# Patient Record
Sex: FEMALE | Race: WHITE | NOT HISPANIC OR LATINO | Employment: PART TIME | URBAN - NONMETROPOLITAN AREA
[De-identification: names, ages, dates, MRNs, and addresses within clinical notes are randomized per-mention and may not be internally consistent; named-entity substitution may affect disease eponyms.]

---

## 2017-10-11 DIAGNOSIS — M25.561 RIGHT KNEE PAIN, UNSPECIFIED CHRONICITY: Primary | ICD-10-CM

## 2017-10-12 ENCOUNTER — OFFICE VISIT (OUTPATIENT)
Dept: ORTHOPEDIC SURGERY | Facility: CLINIC | Age: 27
End: 2017-10-12

## 2017-10-12 VITALS — BODY MASS INDEX: 44.41 KG/M2 | WEIGHT: 293 LBS | HEIGHT: 68 IN | RESPIRATION RATE: 16 BRPM

## 2017-10-12 DIAGNOSIS — M25.561 RIGHT KNEE PAIN, UNSPECIFIED CHRONICITY: Primary | ICD-10-CM

## 2017-10-12 PROCEDURE — 99203 OFFICE O/P NEW LOW 30 MIN: CPT | Performed by: ORTHOPAEDIC SURGERY

## 2017-10-12 RX ORDER — DICLOFENAC SODIUM 75 MG/1
TABLET, DELAYED RELEASE ORAL
COMMUNITY
Start: 2017-09-21

## 2017-10-12 RX ORDER — METHYLPREDNISOLONE 4 MG/1
TABLET ORAL
COMMUNITY
Start: 2017-09-20

## 2017-10-12 NOTE — PROGRESS NOTES
Subjective   Patient ID: Henna Camupzano is a 26 y.o. female  Pain of the Right Knee (Patient denies any injury. Patient has rheumatoid arthritis. 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  Most the right knee pain is medial near the insertion of the pes anserine tendon, denies giving way locking or buckling denies lateral or posterior knee pain occasionally has swelling in the calf and ankle and pain with movement in the ankle at times.  Complains of bilateral hand and foot pain consistent with rheumatoid arthritis.    Review of Systems   Constitutional: Negative for diaphoresis, fever and unexpected weight change.   HENT: Negative for dental problem and sore throat.    Eyes: Negative for visual disturbance.   Respiratory: Negative for shortness of breath.    Cardiovascular: Negative for chest pain.   Gastrointestinal: Negative for abdominal pain, constipation, diarrhea, nausea and vomiting.   Genitourinary: Negative for difficulty urinating and frequency.   Musculoskeletal: Positive for arthralgias.   Neurological: Negative for headaches.   Hematological: Does not bruise/bleed easily.   All other systems reviewed and are negative.      Past Medical History:   Diagnosis Date   • Rheumatoid arthritis         Past Surgical History:   Procedure Laterality Date   • DILATATION AND CURETTAGE     • MOUTH SURGERY     • NOSE SURGERY     • TONSILLECTOMY AND ADENOIDECTOMY         Family History   Problem  "Relation Age of Onset   • Stroke Other    • Heart attack Other    • Diabetes Other    • Liver disease Other        Social History     Social History   • Marital status: Single     Spouse name: N/A   • Number of children: N/A   • Years of education: N/A     Occupational History   • Not on file.     Social History Main Topics   • Smoking status: Current Every Day Smoker     Types: Cigarettes   • Smokeless tobacco: Never Used   • Alcohol use No   • Drug use: No   • Sexual activity: Defer     Other Topics Concern   • Not on file     Social History Narrative   • No narrative on file       All the above social hx, family hx, surgical history,medications, allergies, ros & HPI reviewed.    No Known Allergies    Objective   Resp 16  Ht 68\" (172.7 cm)  Wt 296 lb 12.8 oz (135 kg)  BMI 45.13 kg/m2   Physical Exam  Constitutional: He is oriented to person, place, and time. He appears well-developed and well-nourished.   HENT:   Head: Normocephalic and atraumatic.   Eyes: EOM are normal. Pupils are equal, round, and reactive to light.   Neck: Normal range of motion. Neck supple.   Cardiovascular: Normal rate.    Pulmonary/Chest: Effort normal and breath sounds normal.   Abdominal: Soft.   Neurological: He is alert and oriented to person, place, and time.   Skin: Skin is warm and dry.   Psychiatric: He has a normal mood and affect.   Nursing note and vitals reviewed.       Ortho Exam     Right knee with tenderness near the pes anserine insertional area slight swelling or erythema full active extension Ruma sign negative for medial joint line pain negative for lateral joint line pain ligament exam unremarkable sensor mechanism intact minimal patellofemoral crepitus calf supple no edema in the ankle no cutaneous lesions around the knee or ankle  Assessment/Plan   Review of Radiographic Studies:    Radiographic images today of affected area I personally viewed and showed no sign of acute fracture or " dislocation.      Procedures     Henna was seen today for pain.    Diagnoses and all orders for this visit:    Right knee pain, unspecified chronicity  -     MRI Knee Right Without Contrast; Future     MRI right knee      Recommendations/Plan:   Work/Activity Status: May perform usual activities as tolerated    Patient agreeable to call or return sooner for any concerns.         Patient has failed treatment in the past with oral steroids physical therapy and pain is been present for more than 4-6 months.    Impression:  Pes anserine tendinitis with swelling and tenderness medial tibial metaphysis right knee, rule out stress fracture, history of recently diagnosed rheumatoid arthritis, morbid obesity  Plan:  MRI right knee to rule out stress fracture, consider pes anserine tendon steroid injection next visit and referral to therapy after injection

## 2024-07-16 ENCOUNTER — OFFICE VISIT (OUTPATIENT)
Dept: ENDOCRINOLOGY | Facility: CLINIC | Age: 34
End: 2024-07-16
Payer: COMMERCIAL

## 2024-07-16 VITALS
BODY MASS INDEX: 40.92 KG/M2 | HEART RATE: 90 BPM | HEIGHT: 68 IN | OXYGEN SATURATION: 98 % | DIASTOLIC BLOOD PRESSURE: 80 MMHG | WEIGHT: 270 LBS | SYSTOLIC BLOOD PRESSURE: 124 MMHG

## 2024-07-16 DIAGNOSIS — O24.311 PREEXISTING DIABETES COMPLICATING PREGNANCY IN FIRST TRIMESTER, ANTEPARTUM: Primary | ICD-10-CM

## 2024-07-16 LAB
EXPIRATION DATE: ABNORMAL
EXPIRATION DATE: ABNORMAL
GLUCOSE BLDC GLUCOMTR-MCNC: 151 MG/DL (ref 70–130)
HBA1C MFR BLD: 6.1 % (ref 4.5–5.7)
Lab: ABNORMAL
Lab: ABNORMAL

## 2024-07-16 RX ORDER — BLOOD-GLUCOSE METER
EACH MISCELLANEOUS SEE ADMIN INSTRUCTIONS
COMMUNITY
Start: 2024-06-27

## 2024-07-16 RX ORDER — LABETALOL 200 MG/1
TABLET, FILM COATED ORAL
COMMUNITY
Start: 2024-07-01

## 2024-07-16 RX ORDER — BLOOD SUGAR DIAGNOSTIC
STRIP MISCELLANEOUS SEE ADMIN INSTRUCTIONS
COMMUNITY
Start: 2024-06-27

## 2024-07-16 NOTE — PROGRESS NOTES
"Chief Complaint   Patient presents with    Diabetes        Referring Provider  OMAR Mooney    HPI   Henna Campuzano is a 33 y.o. female had concerns including Diabetes.      Referred by Gyn provider for DM in early pregnancy.   She is 8.5 weeks pregnant. . Her son is 16 years old.  Diabetes was diagnosed about 16 years ago, started with GDM and persisted.   A1c has been as high as 12 as of 1-2 years ago. She did a significant diet change to lose weight and control diet. Cut out carbs almost completely.   Complications include neuropathy.  Last ophtho exam was about 3 years ago.   Current medications for diabetes include metformin 500 mg once daily - takes half to a whole tab \"as needed\".  In the past was on mounjaro (for a month) but had GI eval due to side effects and it was stopped. In the past was on ozempic for about a year.   She lost 88 lbs on her own before Ozempic. Total loss was 120 lbs. Has regained some.   Bgs are as low as 60s on occasion, then up to 170s at times.   She checks her blood sugar 4-7 times per day. Bgs ranging 90s often.     Today is 151 1.5-2 hrs after breakfast. Sipping on coke on the way here.     Diet:  Breakfast: today had G.I. Java with sausage and cheese biscuit (half the biscuit), regular coke, doesn't normally eat breakfast but in this pregnancy has been hungry in the mornings. Now has been eating leftovers - cabbage, carrots and broccoli a few days ago. May have protein bar with cranberries, almond.   Lunch:  similar to breakfast, veggies with meat   Dinner: grilled chicken, veggies  Drinks: rare soda, water with sugar free flavoring or coke zero/sprite zero  Snacks: not much, or has almond protein bar      Past Medical History:   Diagnosis Date    Gestational diabetes     Rheumatoid arthritis     Type 2 diabetes mellitus      Past Surgical History:   Procedure Laterality Date    DILATATION AND CURETTAGE      MOUTH SURGERY      NOSE SURGERY      TONSILLECTOMY AND " "ADENOIDECTOMY        Family History   Problem Relation Age of Onset    Stroke Other     Heart attack Other     Diabetes Other     Liver disease Other       Social History     Socioeconomic History    Marital status: Single   Tobacco Use    Smoking status: Every Day     Types: Cigarettes    Smokeless tobacco: Never   Vaping Use    Vaping status: Never Used   Substance and Sexual Activity    Alcohol use: No    Drug use: No    Sexual activity: Defer      No Known Allergies   Current Outpatient Medications on File Prior to Visit   Medication Sig Dispense Refill    Blood Glucose Monitoring Suppl (ONE TOUCH ULTRA 2) w/Device kit See Admin Instructions.      labetalol (NORMODYNE) 200 MG tablet 1 tablet Orally Twice a day 30 days      metFORMIN (GLUCOPHAGE) 500 MG tablet Take 1 tablet by mouth 3 (Three) Times a Day.      OneTouch Verio test strip See Admin Instructions.      [DISCONTINUED] diclofenac (VOLTAREN) 75 MG EC tablet       [DISCONTINUED] MethylPREDNISolone (MEDROL, EZRA,) 4 MG tablet        No current facility-administered medications on file prior to visit.        Review of Systems   Constitutional:  Positive for appetite change and fatigue.   HENT:  Positive for congestion and dental problem.    Eyes: Negative.    Respiratory: Negative.     Cardiovascular:  Positive for leg swelling.   Gastrointestinal: Negative.    Endocrine: Negative.    Genitourinary:  Positive for urgency.   Musculoskeletal:  Positive for arthralgias, back pain and joint swelling.   Skin: Negative.    Allergic/Immunologic: Negative.    Neurological: Negative.    Hematological: Negative.    Psychiatric/Behavioral: Negative.          /80   Pulse 90   Ht 172.7 cm (68\")   Wt 122 kg (270 lb)   SpO2 98%   BMI 41.05 kg/m²      Physical Exam    Constitutional:  well developed; well nourished  no acute distress  obese - Body mass index is 41.05 kg/m².   ENT/Thyroid: no thyromegaly  no palpable nodules   Eyes: EOM intact  Conjunctiva: clear "   Respiratory:  breathing is unlabored  clear to auscultation bilaterally   Cardiovascular:  regular rate and rhythm, S1, S2 normal, no murmur, click, rub or gallop   Chest:  Not performed.   Abdomen: Not performed.   : Not performed.   Musculoskeletal: negative findings:  ROM of all joints is normal, no deformities present   Skin: dry and warm   Neuro: normal without focal findings and mental status, speech normal, alert and oriented x3   Psych: oriented to time, place and person, mood and affect are within normal limits     LABS AND IMAGING    HbA1c:  Lab Results   Component Value Date    HGBA1C 6.1 (A) 07/16/2024     Glucose:  Lab Results   Component Value Date    POCGLU 151 (A) 07/16/2024     7/3/24 TSH 1.68, Free T4 1.1  6/26/24 Cr 0.71, GFR 94  5/22/24 total cholesterol 164, HDL 62, trig 73, LDL 87    Assessment and Plan    Diagnoses and all orders for this visit:    1. Preexisting diabetes complicating pregnancy in first trimester, antepartum (Primary)  DM 2 in pregnancy, 8 weeks and 5 days.  Diagnosed 16 years ago, first with gestational diabetes and then persisted.  A1c has been as high as 12 in the past.  Diabetes complicated by neuropathy.  Today A1c is 6.1.  Mostly diet controlled at this time.  She has been taking metformin only as needed.  Postprandial glucose level is too high today, had fast food with a regular soda.  She rarely has regular soda.  Avoid all regular sweetened beverages.  Metformin can be continued only in first trimester. Discontinue at second trimester.  Would encourage her to take on a scheduled basis as it cannot cause hypoglycemia.  She may have experienced reactive hypoglycemia.  If glucose levels are uncontrolled on metformin, insulin will be needed. As pregnancy and insulin resistance progress, insulin is likely going to be needed.  She is consuming a low-carb diet at this time.  Monitor urine ketones and increase carb intake if moderate or high.  Management of gestational  diabetes was discussed in detail.  Patient was provided with dietary guidelines including carbohydrate targets with meals/snacks as well as BG targets were provided: Fasting less than 95, 1 hour postprandial less than 140, 2 hours postprandial less than 120.    Potential complications related to uncontrolled gestational diabetes were also discussed including, but not limited to, LGA and macrosomia, stillbirth, polyhydramnios,  morbidity including hypoglycemia, increased risk for DM2 beyond pregnancy and risk for GDM with future pregnancies.  Discussed expected changes and potential increases in blood sugar during pregnancy.  I have asked that she monitor her BG's 4+ times per day, fasting, 1 and/or 2 hours postprandial and return in 2-4 weeks for follow-up.  If BG's are not at target despite dietary modifications, insulin may be needed. Pt was advised to contact our office prior to follow-up if BGs are not at target.   Send BG logs weekly.  -     POC Glucose, Blood  -     POC Glycosylated Hemoglobin (Hb A1C)  -     acetone, urine, test strip; Use 1 strip Daily.  Dispense: 50 strip; Refill: 3         Return in about 1 month (around 2024) for next scheduled follow up, monthly until delivery. The patient was instructed to contact the clinic with any interval questions or concerns.    Electronically signed by: Mandie Jones DO   Endocrinologist    Please note that portions of this note were completed with a voice recognition program.

## 2024-07-16 NOTE — PATIENT INSTRUCTIONS
Check urine ketones daily. Trace or small is normal/expected in pregnancy. Moderate or high means you need to consume more carbs.     Stop metformin at the end of your first trimester.

## 2024-08-05 ENCOUNTER — TRANSCRIBE ORDERS (OUTPATIENT)
Dept: LAB | Facility: HOSPITAL | Age: 34
End: 2024-08-05
Payer: COMMERCIAL

## 2024-08-05 ENCOUNTER — LAB (OUTPATIENT)
Dept: LAB | Facility: HOSPITAL | Age: 34
End: 2024-08-05
Payer: COMMERCIAL

## 2024-08-05 DIAGNOSIS — Z34.81 PRENATAL CARE, SUBSEQUENT PREGNANCY, FIRST TRIMESTER: ICD-10-CM

## 2024-08-05 DIAGNOSIS — Z34.81 PRENATAL CARE, SUBSEQUENT PREGNANCY, FIRST TRIMESTER: Primary | ICD-10-CM

## 2024-08-05 LAB
ABO GROUP BLD: NORMAL
AMPHET+METHAMPHET UR QL: NEGATIVE
AMPHETAMINES UR QL: NEGATIVE
BARBITURATES UR QL SCN: NEGATIVE
BASOPHILS # BLD AUTO: 0.06 10*3/MM3 (ref 0–0.2)
BASOPHILS NFR BLD AUTO: 0.4 % (ref 0–1.5)
BENZODIAZ UR QL SCN: NEGATIVE
BILIRUB UR QL STRIP: NEGATIVE
BLD GP AB SCN SERPL QL: NEGATIVE
BUPRENORPHINE SERPL-MCNC: NEGATIVE NG/ML
CANNABINOIDS SERPL QL: NEGATIVE
CLARITY UR: CLEAR
COCAINE UR QL: NEGATIVE
COLOR UR: YELLOW
DEPRECATED RDW RBC AUTO: 43.9 FL (ref 37–54)
EOSINOPHIL # BLD AUTO: 0.33 10*3/MM3 (ref 0–0.4)
EOSINOPHIL NFR BLD AUTO: 2 % (ref 0.3–6.2)
ERYTHROCYTE [DISTWIDTH] IN BLOOD BY AUTOMATED COUNT: 12.7 % (ref 12.3–15.4)
FENTANYL UR-MCNC: NEGATIVE NG/ML
GLUCOSE UR STRIP-MCNC: NEGATIVE MG/DL
HBA1C MFR BLD: 5.7 % (ref 4.8–5.6)
HCT VFR BLD AUTO: 41.5 % (ref 34–46.6)
HGB BLD-MCNC: 14.2 G/DL (ref 12–15.9)
HGB UR QL STRIP.AUTO: NEGATIVE
IMM GRANULOCYTES # BLD AUTO: 0.06 10*3/MM3 (ref 0–0.05)
IMM GRANULOCYTES NFR BLD AUTO: 0.4 % (ref 0–0.5)
KETONES UR QL STRIP: NEGATIVE
LEUKOCYTE ESTERASE UR QL STRIP.AUTO: NEGATIVE
LYMPHOCYTES # BLD AUTO: 5.16 10*3/MM3 (ref 0.7–3.1)
LYMPHOCYTES NFR BLD AUTO: 31.7 % (ref 19.6–45.3)
MCH RBC QN AUTO: 32.3 PG (ref 26.6–33)
MCHC RBC AUTO-ENTMCNC: 34.2 G/DL (ref 31.5–35.7)
MCV RBC AUTO: 94.5 FL (ref 79–97)
METHADONE UR QL SCN: NEGATIVE
MONOCYTES # BLD AUTO: 0.85 10*3/MM3 (ref 0.1–0.9)
MONOCYTES NFR BLD AUTO: 5.2 % (ref 5–12)
NEUTROPHILS NFR BLD AUTO: 60.3 % (ref 42.7–76)
NEUTROPHILS NFR BLD AUTO: 9.8 10*3/MM3 (ref 1.7–7)
NITRITE UR QL STRIP: NEGATIVE
NRBC BLD AUTO-RTO: 0 /100 WBC (ref 0–0.2)
OPIATES UR QL: NEGATIVE
OXYCODONE UR QL SCN: NEGATIVE
PCP UR QL SCN: NEGATIVE
PH UR STRIP.AUTO: 7 [PH] (ref 5–8)
PLATELET # BLD AUTO: 200 10*3/MM3 (ref 140–450)
PMV BLD AUTO: 12.2 FL (ref 6–12)
PROT UR QL STRIP: ABNORMAL
RBC # BLD AUTO: 4.39 10*6/MM3 (ref 3.77–5.28)
RH BLD: POSITIVE
SP GR UR STRIP: 1.03 (ref 1–1.03)
TRICYCLICS UR QL SCN: NEGATIVE
UROBILINOGEN UR QL STRIP: ABNORMAL
WBC NRBC COR # BLD AUTO: 16.26 10*3/MM3 (ref 3.4–10.8)

## 2024-08-05 PROCEDURE — 84450 TRANSFERASE (AST) (SGOT): CPT

## 2024-08-05 PROCEDURE — 86850 RBC ANTIBODY SCREEN: CPT

## 2024-08-05 PROCEDURE — 84156 ASSAY OF PROTEIN URINE: CPT

## 2024-08-05 PROCEDURE — 86762 RUBELLA ANTIBODY: CPT

## 2024-08-05 PROCEDURE — 84460 ALANINE AMINO (ALT) (SGPT): CPT

## 2024-08-05 PROCEDURE — 82570 ASSAY OF URINE CREATININE: CPT

## 2024-08-05 PROCEDURE — 83036 HEMOGLOBIN GLYCOSYLATED A1C: CPT

## 2024-08-05 PROCEDURE — 86803 HEPATITIS C AB TEST: CPT

## 2024-08-05 PROCEDURE — 85025 COMPLETE CBC W/AUTO DIFF WBC: CPT

## 2024-08-05 PROCEDURE — 83615 LACTATE (LD) (LDH) ENZYME: CPT

## 2024-08-05 PROCEDURE — 87661 TRICHOMONAS VAGINALIS AMPLIF: CPT

## 2024-08-05 PROCEDURE — 36415 COLL VENOUS BLD VENIPUNCTURE: CPT

## 2024-08-05 PROCEDURE — 84550 ASSAY OF BLOOD/URIC ACID: CPT

## 2024-08-05 PROCEDURE — 80307 DRUG TEST PRSMV CHEM ANLYZR: CPT

## 2024-08-05 PROCEDURE — 86780 TREPONEMA PALLIDUM: CPT

## 2024-08-05 PROCEDURE — 87591 N.GONORRHOEAE DNA AMP PROB: CPT

## 2024-08-05 PROCEDURE — 86901 BLOOD TYPING SEROLOGIC RH(D): CPT

## 2024-08-05 PROCEDURE — 86900 BLOOD TYPING SEROLOGIC ABO: CPT

## 2024-08-05 PROCEDURE — 86787 VARICELLA-ZOSTER ANTIBODY: CPT

## 2024-08-05 PROCEDURE — 87340 HEPATITIS B SURFACE AG IA: CPT

## 2024-08-05 PROCEDURE — 87086 URINE CULTURE/COLONY COUNT: CPT

## 2024-08-05 PROCEDURE — G0432 EIA HIV-1/HIV-2 SCREEN: HCPCS

## 2024-08-05 PROCEDURE — 81001 URINALYSIS AUTO W/SCOPE: CPT

## 2024-08-05 PROCEDURE — 82565 ASSAY OF CREATININE: CPT

## 2024-08-05 PROCEDURE — 87491 CHLMYD TRACH DNA AMP PROBE: CPT

## 2024-08-05 PROCEDURE — 84520 ASSAY OF UREA NITROGEN: CPT

## 2024-08-06 ENCOUNTER — TRANSCRIBE ORDERS (OUTPATIENT)
Dept: OBSTETRICS AND GYNECOLOGY | Facility: HOSPITAL | Age: 34
End: 2024-08-06
Payer: COMMERCIAL

## 2024-08-06 DIAGNOSIS — N96 RECURRENT PREGNANCY LOSS: ICD-10-CM

## 2024-08-06 DIAGNOSIS — E28.2 PCOS (POLYCYSTIC OVARIAN SYNDROME): ICD-10-CM

## 2024-08-06 DIAGNOSIS — O24.111 PRE-EXISTING TYPE 2 DIABETES MELLITUS IN PREGNANCY IN FIRST TRIMESTER: Primary | ICD-10-CM

## 2024-08-06 DIAGNOSIS — O09.299 HX OF PREECLAMPSIA, PRIOR PREGNANCY, CURRENTLY PREGNANT: ICD-10-CM

## 2024-08-06 DIAGNOSIS — O10.919 HTN IN PREGNANCY, CHRONIC: ICD-10-CM

## 2024-08-06 DIAGNOSIS — O99.210 OBESITY IN PREGNANCY, ANTEPARTUM: ICD-10-CM

## 2024-08-06 LAB
ALT SERPL W P-5'-P-CCNC: 12 U/L (ref 1–33)
AST SERPL-CCNC: 11 U/L (ref 1–32)
BACTERIA UR QL AUTO: ABNORMAL /HPF
BUN SERPL-MCNC: 12 MG/DL (ref 6–20)
CREAT SERPL-MCNC: 0.59 MG/DL (ref 0.57–1)
CREAT UR-MCNC: 138.3 MG/DL
EGFRCR SERPLBLD CKD-EPI 2021: 120.7 ML/MIN/1.73
HBV SURFACE AG SERPL QL IA: NORMAL
HIV 1+2 AB+HIV1 P24 AG SERPL QL IA: NORMAL
HYALINE CASTS UR QL AUTO: ABNORMAL /LPF
LDH SERPL-CCNC: 174 U/L (ref 135–214)
PROT ?TM UR-MCNC: 10.7 MG/DL
PROT/CREAT UR: 77.4 MG/G CREA (ref 0–200)
RBC # UR STRIP: ABNORMAL /HPF
REF LAB TEST METHOD: ABNORMAL
SQUAMOUS #/AREA URNS HPF: ABNORMAL /HPF
TREPONEMA PALLIDUM IGG+IGM AB [PRESENCE] IN SERUM OR PLASMA BY IMMUNOASSAY: NORMAL
URATE SERPL-MCNC: 3.2 MG/DL (ref 2.4–5.7)
WBC # UR STRIP: ABNORMAL /HPF

## 2024-08-07 LAB
BACTERIA SPEC AEROBE CULT: NO GROWTH
HCV AB SERPL QL IA: NORMAL
HCV IGG SERPL QL IA: NON REACTIVE
RUBV IGG SERPL IA-ACNC: 1.18 INDEX
VZV IGG SER IA-ACNC: 393 INDEX

## 2024-08-08 LAB
C TRACH RRNA SPEC QL NAA+PROBE: NEGATIVE
N GONORRHOEA RRNA SPEC QL NAA+PROBE: NEGATIVE
T VAGINALIS RRNA SPEC QL NAA+PROBE: NEGATIVE

## 2024-08-11 ENCOUNTER — HOSPITAL ENCOUNTER (EMERGENCY)
Facility: HOSPITAL | Age: 34
Discharge: HOME | End: 2024-08-11
Attending: STUDENT IN AN ORGANIZED HEALTH CARE EDUCATION/TRAINING PROGRAM | Admitting: STUDENT IN AN ORGANIZED HEALTH CARE EDUCATION/TRAINING PROGRAM
Payer: COMMERCIAL

## 2024-08-11 ENCOUNTER — APPOINTMENT (OUTPATIENT)
Dept: ULTRASOUND IMAGING | Facility: HOSPITAL | Age: 34
End: 2024-08-11
Payer: COMMERCIAL

## 2024-08-11 VITALS
BODY MASS INDEX: 40.16 KG/M2 | SYSTOLIC BLOOD PRESSURE: 137 MMHG | TEMPERATURE: 98.6 F | HEIGHT: 68 IN | RESPIRATION RATE: 16 BRPM | OXYGEN SATURATION: 99 % | WEIGHT: 265 LBS | DIASTOLIC BLOOD PRESSURE: 99 MMHG | HEART RATE: 104 BPM

## 2024-08-11 DIAGNOSIS — O20.0 THREATENED ABORTION: Primary | ICD-10-CM

## 2024-08-11 DIAGNOSIS — Z3A.11 11 WEEKS GESTATION OF PREGNANCY: ICD-10-CM

## 2024-08-11 LAB
BASOPHILS # BLD AUTO: 0.04 10*3/MM3 (ref 0–0.2)
BASOPHILS NFR BLD AUTO: 0.3 % (ref 0–1.5)
DEPRECATED RDW RBC AUTO: 44.5 FL (ref 37–54)
EOSINOPHIL # BLD AUTO: 0.23 10*3/MM3 (ref 0–0.4)
EOSINOPHIL NFR BLD AUTO: 1.5 % (ref 0.3–6.2)
ERYTHROCYTE [DISTWIDTH] IN BLOOD BY AUTOMATED COUNT: 12.9 % (ref 12.3–15.4)
HCG INTACT+B SERPL-ACNC: NORMAL MIU/ML
HCT VFR BLD AUTO: 39.4 % (ref 34–46.6)
HGB BLD-MCNC: 13.2 G/DL (ref 12–15.9)
HOLD SPECIMEN: NORMAL
IMM GRANULOCYTES # BLD AUTO: 0.05 10*3/MM3 (ref 0–0.05)
IMM GRANULOCYTES NFR BLD AUTO: 0.3 % (ref 0–0.5)
LYMPHOCYTES # BLD AUTO: 3.68 10*3/MM3 (ref 0.7–3.1)
LYMPHOCYTES NFR BLD AUTO: 24.1 % (ref 19.6–45.3)
MCH RBC QN AUTO: 31.6 PG (ref 26.6–33)
MCHC RBC AUTO-ENTMCNC: 33.5 G/DL (ref 31.5–35.7)
MCV RBC AUTO: 94.3 FL (ref 79–97)
MONOCYTES # BLD AUTO: 0.73 10*3/MM3 (ref 0.1–0.9)
MONOCYTES NFR BLD AUTO: 4.8 % (ref 5–12)
NEUTROPHILS NFR BLD AUTO: 10.54 10*3/MM3 (ref 1.7–7)
NEUTROPHILS NFR BLD AUTO: 69 % (ref 42.7–76)
NRBC BLD AUTO-RTO: 0 /100 WBC (ref 0–0.2)
PLATELET # BLD AUTO: 210 10*3/MM3 (ref 140–450)
PMV BLD AUTO: 11.1 FL (ref 6–12)
RBC # BLD AUTO: 4.18 10*6/MM3 (ref 3.77–5.28)
WBC NRBC COR # BLD AUTO: 15.27 10*3/MM3 (ref 3.4–10.8)
WHOLE BLOOD HOLD COAG: NORMAL
WHOLE BLOOD HOLD SPECIMEN: NORMAL

## 2024-08-11 PROCEDURE — 99284 EMERGENCY DEPT VISIT MOD MDM: CPT

## 2024-08-11 PROCEDURE — 85025 COMPLETE CBC W/AUTO DIFF WBC: CPT | Performed by: STUDENT IN AN ORGANIZED HEALTH CARE EDUCATION/TRAINING PROGRAM

## 2024-08-11 PROCEDURE — 36415 COLL VENOUS BLD VENIPUNCTURE: CPT

## 2024-08-11 PROCEDURE — 76815 OB US LIMITED FETUS(S): CPT

## 2024-08-11 PROCEDURE — 84702 CHORIONIC GONADOTROPIN TEST: CPT | Performed by: STUDENT IN AN ORGANIZED HEALTH CARE EDUCATION/TRAINING PROGRAM

## 2024-08-11 RX ORDER — SODIUM CHLORIDE 0.9 % (FLUSH) 0.9 %
10 SYRINGE (ML) INJECTION AS NEEDED
Status: DISCONTINUED | OUTPATIENT
Start: 2024-08-11 | End: 2024-08-11 | Stop reason: HOSPADM

## 2024-08-11 RX ORDER — PROGESTERONE 200 MG/1
200 CAPSULE ORAL DAILY
COMMUNITY
Start: 2024-08-05

## 2024-08-11 NOTE — ED PROVIDER NOTES
Subjective   History of Present Illness  33-year-old female presents emergency department today with vaginal bleeding and 11 weeks pregnant.  She reports that that she had she went to a different ER yesterday and they saw fetal heart tones but she did not have a lot of marcel in their ability so she wanted to come here today.  She has had clots the size of nerds she states.  She reports that she is  AB 9.  He is much less than her normal menses.  Right red spotting with clots as mentioned.  She currently has had a high risk OB/GYN and her normal OB/GYN as well.    History provided by:  Patient and spouse   used: No    Vaginal Bleeding - Pregnant  Quality:  Bright red  Severity:  Moderate  Onset quality:  Sudden  Timing:  Constant  Progression:  Resolved  Chronicity:  New  Prior pregnancy: yes    Pregnancy confirmed by ultrasound: yes    Gestational age:  10  Prenatal care:  Regular prenatal care  Number of pads used:  1  Context: not after bowel movement, not after urination, not during bowel movement, not during intercourse and not spontaneously    Relieved by:  Nothing  Worsened by:  Nothing  Ineffective treatments:  None tried  Associated symptoms: no abdominal pain, no back pain, no dysuria, no fatigue and no vaginal discharge    Risk factors: no gynecological surgery, no hx of ectopic pregnancy, no hx of endometriosis and does not have multiple partners        Review of Systems   Constitutional:  Negative for fatigue.   Respiratory:  Negative for chest tightness, shortness of breath and wheezing.    Cardiovascular:  Negative for chest pain and palpitations.   Gastrointestinal:  Negative for abdominal pain.   Genitourinary:  Negative for dysuria and vaginal discharge.   Musculoskeletal:  Negative for back pain.       Past Medical History:   Diagnosis Date    Fibromyalgia     Gestational diabetes     Rheumatoid arthritis     Type 2 diabetes mellitus        No Known Allergies    Past  Surgical History:   Procedure Laterality Date    DILATATION AND CURETTAGE      EAR RECONSTRUCTION      MOUTH SURGERY      NOSE SURGERY      TONSILLECTOMY AND ADENOIDECTOMY         Family History   Problem Relation Age of Onset    Stroke Other     Heart attack Other     Diabetes Other     Liver disease Other        Social History     Socioeconomic History    Marital status: Single   Tobacco Use    Smoking status: Every Day     Types: Cigarettes    Smokeless tobacco: Never   Vaping Use    Vaping status: Never Used   Substance and Sexual Activity    Alcohol use: No    Drug use: No    Sexual activity: Defer           Objective   Physical Exam  Vitals and nursing note reviewed.   Constitutional:       General: She is not in acute distress.     Appearance: She is well-developed. She is not diaphoretic.      Comments: Warm pink dry afebrile nontoxic no acute distress   HENT:      Head: Normocephalic and atraumatic.      Nose: Nose normal.   Eyes:      General: No scleral icterus.     Conjunctiva/sclera: Conjunctivae normal.   Cardiovascular:      Rate and Rhythm: Normal rate and regular rhythm.      Heart sounds: Normal heart sounds. No murmur heard.  Pulmonary:      Effort: Pulmonary effort is normal. No respiratory distress.      Breath sounds: Normal breath sounds.   Abdominal:      General: Bowel sounds are normal.      Palpations: Abdomen is soft.      Tenderness: There is no abdominal tenderness.   Musculoskeletal:         General: Normal range of motion.      Cervical back: Normal range of motion and neck supple.   Skin:     General: Skin is warm and dry.   Neurological:      Mental Status: She is alert and oriented to person, place, and time.   Psychiatric:         Behavior: Behavior normal.         Procedures           ED Course                                             Medical Decision Making  Amount and/or Complexity of Data Reviewed  Labs: ordered.  Radiology: ordered.    Risk  Prescription drug  management.        Final diagnoses:   Threatened    11 weeks gestation of pregnancy       ED Disposition  ED Disposition       ED Disposition   Discharge    Condition   Stable    Comment   --               Your high risk OB/GYN               Medication List      No changes were made to your prescriptions during this visit.            Navjot Ro PA  [phone]

## 2024-08-19 ENCOUNTER — HOSPITAL ENCOUNTER (OUTPATIENT)
Dept: WOMENS IMAGING | Facility: HOSPITAL | Age: 34
Discharge: HOME | End: 2024-08-19
Admitting: STUDENT IN AN ORGANIZED HEALTH CARE EDUCATION/TRAINING PROGRAM
Payer: COMMERCIAL

## 2024-08-19 ENCOUNTER — OFFICE VISIT (OUTPATIENT)
Dept: ENDOCRINOLOGY | Facility: CLINIC | Age: 34
End: 2024-08-19
Payer: COMMERCIAL

## 2024-08-19 ENCOUNTER — OFFICE VISIT (OUTPATIENT)
Dept: OBSTETRICS AND GYNECOLOGY | Facility: HOSPITAL | Age: 34
End: 2024-08-19
Payer: COMMERCIAL

## 2024-08-19 VITALS
SYSTOLIC BLOOD PRESSURE: 128 MMHG | DIASTOLIC BLOOD PRESSURE: 68 MMHG | HEIGHT: 68 IN | OXYGEN SATURATION: 99 % | WEIGHT: 280 LBS | BODY MASS INDEX: 42.44 KG/M2 | HEART RATE: 100 BPM

## 2024-08-19 VITALS — BODY MASS INDEX: 42.27 KG/M2 | WEIGHT: 278 LBS | SYSTOLIC BLOOD PRESSURE: 134 MMHG | DIASTOLIC BLOOD PRESSURE: 65 MMHG

## 2024-08-19 DIAGNOSIS — N96 RECURRENT PREGNANCY LOSS: ICD-10-CM

## 2024-08-19 DIAGNOSIS — O99.210 OBESITY IN PREGNANCY, ANTEPARTUM: ICD-10-CM

## 2024-08-19 DIAGNOSIS — Z3A.11 11 WEEKS GESTATION OF PREGNANCY: ICD-10-CM

## 2024-08-19 DIAGNOSIS — O09.529 ANTEPARTUM MULTIGRAVIDA OF ADVANCED MATERNAL AGE: Primary | ICD-10-CM

## 2024-08-19 DIAGNOSIS — O24.311 PREEXISTING DIABETES COMPLICATING PREGNANCY IN FIRST TRIMESTER, ANTEPARTUM: Primary | ICD-10-CM

## 2024-08-19 DIAGNOSIS — O10.919 CHRONIC HYPERTENSION AFFECTING PREGNANCY: ICD-10-CM

## 2024-08-19 DIAGNOSIS — E66.01 MORBID OBESITY WITH BMI OF 40.0-44.9, ADULT: ICD-10-CM

## 2024-08-19 DIAGNOSIS — M79.7 FIBROMYALGIA: ICD-10-CM

## 2024-08-19 DIAGNOSIS — O09.299 HX OF PREECLAMPSIA, PRIOR PREGNANCY, CURRENTLY PREGNANT: ICD-10-CM

## 2024-08-19 DIAGNOSIS — O24.111 PRE-EXISTING TYPE 2 DIABETES MELLITUS IN PREGNANCY IN FIRST TRIMESTER: ICD-10-CM

## 2024-08-19 DIAGNOSIS — O24.119 TYPE 2 DIABETES MELLITUS AFFECTING PREGNANCY, ANTEPARTUM: ICD-10-CM

## 2024-08-19 DIAGNOSIS — O10.919 HTN IN PREGNANCY, CHRONIC: ICD-10-CM

## 2024-08-19 DIAGNOSIS — E28.2 PCOS (POLYCYSTIC OVARIAN SYNDROME): ICD-10-CM

## 2024-08-19 LAB
EXPIRATION DATE: ABNORMAL
EXPIRATION DATE: ABNORMAL
GLUCOSE BLDC GLUCOMTR-MCNC: 175 MG/DL (ref 70–130)
HBA1C MFR BLD: 6 % (ref 4.5–5.7)
Lab: ABNORMAL
Lab: ABNORMAL

## 2024-08-19 PROCEDURE — 76813 OB US NUCHAL MEAS 1 GEST: CPT | Performed by: OBSTETRICS & GYNECOLOGY

## 2024-08-19 PROCEDURE — 82947 ASSAY GLUCOSE BLOOD QUANT: CPT | Performed by: INTERNAL MEDICINE

## 2024-08-19 PROCEDURE — 76801 OB US < 14 WKS SINGLE FETUS: CPT

## 2024-08-19 PROCEDURE — 1160F RVW MEDS BY RX/DR IN RCRD: CPT | Performed by: INTERNAL MEDICINE

## 2024-08-19 PROCEDURE — 99214 OFFICE O/P EST MOD 30 MIN: CPT | Performed by: INTERNAL MEDICINE

## 2024-08-19 PROCEDURE — 76817 TRANSVAGINAL US OBSTETRIC: CPT | Performed by: OBSTETRICS & GYNECOLOGY

## 2024-08-19 PROCEDURE — 1159F MED LIST DOCD IN RCRD: CPT | Performed by: INTERNAL MEDICINE

## 2024-08-19 PROCEDURE — 3044F HG A1C LEVEL LT 7.0%: CPT | Performed by: INTERNAL MEDICINE

## 2024-08-19 PROCEDURE — 99203 OFFICE O/P NEW LOW 30 MIN: CPT | Performed by: OBSTETRICS & GYNECOLOGY

## 2024-08-19 PROCEDURE — 76801 OB US < 14 WKS SINGLE FETUS: CPT | Performed by: OBSTETRICS & GYNECOLOGY

## 2024-08-19 PROCEDURE — 76817 TRANSVAGINAL US OBSTETRIC: CPT

## 2024-08-19 PROCEDURE — 76813 OB US NUCHAL MEAS 1 GEST: CPT

## 2024-08-19 PROCEDURE — 83036 HEMOGLOBIN GLYCOSYLATED A1C: CPT | Performed by: INTERNAL MEDICINE

## 2024-08-19 RX ORDER — NIFEDIPINE 30 MG/1
30 TABLET, EXTENDED RELEASE ORAL DAILY
COMMUNITY
Start: 2024-08-13

## 2024-08-19 RX ORDER — ASPIRIN 81 MG/1
81 TABLET ORAL DAILY
COMMUNITY

## 2024-08-19 RX ORDER — PRENATAL VIT/IRON FUM/FOLIC AC 27MG-0.8MG
1 TABLET ORAL DAILY
COMMUNITY

## 2024-08-19 RX ORDER — SERTRALINE HYDROCHLORIDE 25 MG/1
25 TABLET, FILM COATED ORAL AS NEEDED
COMMUNITY
Start: 2024-07-09

## 2024-08-19 RX ORDER — ACYCLOVIR 400 MG/1
1 TABLET ORAL
Qty: 9 EACH | Refills: 3 | Status: SHIPPED | OUTPATIENT
Start: 2024-08-19

## 2024-08-19 RX ORDER — INSULIN LISPRO 100 [IU]/ML
4 INJECTION, SOLUTION INTRAVENOUS; SUBCUTANEOUS 3 TIMES DAILY
Qty: 15 ML | Refills: 1 | Status: SHIPPED | OUTPATIENT
Start: 2024-08-19

## 2024-08-19 NOTE — ASSESSMENT & PLAN NOTE
On a daily ASA 81mgs. Could consider increase to 2 daily ASA for a total of 162mgs.     Baseline eval as above.

## 2024-08-19 NOTE — ASSESSMENT & PLAN NOTE
Patient sees Dr. Jones for management of her diabetes. She thinks she is getting a CGM and insulin pump today. She is currently only on Metformin 1000mgs BID.     I reviewed the goals for blood glucose in pregnancy, including fasting blood sugars < 90-95mg/dl and 2-hr post-prandial values < 110-120mg/dl.     Patient reports that her blood sugars range between 99 and 150's.     We reviewed the complications associated with poorly controlled diabetes in pregnancy including increased risk for miscarriage, congenital abnormalities, macrosomia, need for operative delivery, damage to maternal or fetal structures at delivery, stillbirth,  complications requiring admission to the NICU, and childhood obesity and/or early development of Type 2 DM.    I would recommend starting the patient on subcutaneous insulin to achieve better blood glucose control. The addition of a CGM would be extremely helpful in allowing us to see exactly the level of control and adjust medications appropriately.      - Follow-up here scheduled in 4wks   - Anatomy survey will be at 21wks   - Fetal echo will be at approximately 25wks of pregnancy   - We will also plan on q 4 wk ultrasounds for growth   - Patient will need  testing starting at 32 wks of pregnancy

## 2024-08-19 NOTE — ASSESSMENT & PLAN NOTE
Pt is currently taking Labetalol 200mgs BID and a daily ASA 81mgs. BP today is 134/65.     Her baseline preeclampsia evaluation is normal: Urine P:C ratio 0.077, serum creatinine 0.59, uric acid 3.2, AST/ALT/LDH 11//174, and platelets 210.      - Follow-up scheduled in 4wks   - Recommend q 4 wk growth ultrasounds   - Will need  testing starting at 32wks

## 2024-08-19 NOTE — ASSESSMENT & PLAN NOTE
S/p low risk NIPT.   NT appears normal on today's exam.      - Follow-up scheduled in 4 wks   - Will plan anatomic survey around 21wks and fetal echo around 25wks   - Pt will need serial growth ultrasounds

## 2024-08-19 NOTE — PROGRESS NOTES
"    Maternal/Fetal Medicine Consult Note     Name: Henna Campuzano    : 1990     MRN: [id number]     Referring Provider: Edna Muller DO    Chief Complaint  T2DM, AMA, CHTN, recurrent preg loss, Hx preeclampsia,     Subjective     History of Present Illness:  Henna Campuzano is a 33 y.o.  11w5d who presents today for a dating/viability ultrasound. She is s/p low risk NIPT.     Pt denies LOF/VB.    XIOMARA: Estimated Date of Delivery: 3/5/25     ROS:   As noted in HPI.     Past Medical History:   Diagnosis Date    Fibromyalgia     Gestational diabetes     Hypertension     Rheumatoid arthritis     Type 2 diabetes mellitus       Past Surgical History:   Procedure Laterality Date    DILATATION AND CURETTAGE      EAR RECONSTRUCTION      LAPAROSCOPIC CHOLECYSTECTOMY      MOUTH SURGERY      NOSE SURGERY      TONSILLECTOMY AND ADENOIDECTOMY        OB History          11    Para   1    Term   0       1    AB   9    Living   1         SAB   9    IAB   0    Ectopic   0    Molar   0    Multiple   0    Live Births   1          Obstetric Comments   FOB #1 Pregnancy #1, 2, 3  FOB #2 Pregnancy #4 SAB, D&C, #5-10  FOB #3 Pregnancy #1  current                 Objective     Vital Signs  /65   Wt 126 kg (278 lb)   Estimated body mass index is 42.27 kg/m² as calculated from the following:    Height as of 24: 172.7 cm (68\").    Weight as of this encounter: 126 kg (278 lb).    Physical Exam  Constitutional:       Appearance: Normal appearance. She is obese.   HENT:      Head: Normocephalic and atraumatic.   Pulmonary:      Effort: Pulmonary effort is normal.   Musculoskeletal:         General: Normal range of motion.   Neurological:      General: No focal deficit present.      Mental Status: She is alert and oriented to person, place, and time.   Psychiatric:         Mood and Affect: Mood normal.         Behavior: Behavior normal.         Thought Content: Thought content normal.         Judgment: " Judgment normal.     Ultrasound Impression:   Viable SIUP with CRL not measuring consistent with previously assigned dates. Size is c/w 11 weeks 5 days with XIOMARA of 3/5/2025.     Assessment and Plan     Diagnoses and all orders for this visit:    1. Antepartum multigravida of advanced maternal age (Primary)  Assessment & Plan:  S/p low risk NIPT.   NT appears normal on today's exam.      - Follow-up scheduled in 4 wks   - Will plan anatomic survey around 21wks and fetal echo around 25wks   - Pt will need serial growth ultrasounds      2. Chronic hypertension affecting pregnancy  Assessment & Plan:  Pt is currently taking Labetalol 200mgs BID and a daily ASA 81mgs. BP today is 134/65.     Her baseline preeclampsia evaluation is normal: Urine P:C ratio 0.077, serum creatinine 0.59, uric acid 3.2, AST/ALT/LDH /174, and platelets 210.      - Follow-up scheduled in 4wks   - Recommend q 4 wk growth ultrasounds   - Will need  testing starting at 32wks      3. Fibromyalgia    4. Morbid obesity with BMI of 40.0-44.9, adult    5. Recurrent pregnancy loss    6. Type 2 diabetes mellitus affecting pregnancy, antepartum  Assessment & Plan:  Patient sees Dr. Jones for management of her diabetes. She thinks she is getting a CGM and insulin pump today. She is currently only on Metformin 1000mgs BID.     I reviewed the goals for blood glucose in pregnancy, including fasting blood sugars < 90-95mg/dl and 2-hr post-prandial values < 110-120mg/dl.     Patient reports that her blood sugars range between 99 and 150's.     We reviewed the complications associated with poorly controlled diabetes in pregnancy including increased risk for miscarriage, congenital abnormalities, macrosomia, need for operative delivery, damage to maternal or fetal structures at delivery, stillbirth,  complications requiring admission to the NICU, and childhood obesity and/or early development of Type 2 DM.    I would recommend starting the  patient on subcutaneous insulin to achieve better blood glucose control. The addition of a CGM would be extremely helpful in allowing us to see exactly the level of control and adjust medications appropriately.      - Follow-up here scheduled in 4wks   - Anatomy survey will be at 21wks   - Fetal echo will be at approximately 25wks of pregnancy   - We will also plan on q 4 wk ultrasounds for growth   - Patient will need  testing starting at 32 wks of pregnancy      7. 11 weeks gestation of pregnancy         Follow Up  Return in about 4 weeks (around 2024).    I spent 30 minutes caring for the patient on the day of service. This included: obtaining or reviewing a separately obtained medical history, reviewing patient records, performing a medically appropriate exam and/or evaluation, counseling or educating the patient/family/caregiver, ordering medications, labs, and/or procedures and documenting such in the medical record. This does not include time spent on review and interpretation of other tests such as fetal ultrasound or the performance of other procedures such as amniocentesis or CVS.      Geena Decker MD  2024

## 2024-08-19 NOTE — PATIENT INSTRUCTIONS
Start taking humalog 4 units before meals (about 15 minutes). This can be taken every 3-4 hours when eating.   Increase daily by 2-4 units until post meal glucose levels are < 140 at 1 hour and < 120 at two hours.     Download the dexcom clarity tarun and please contact the office to link the dexcom account.     Send updated glucose logs in 1-2 days please.

## 2024-08-19 NOTE — LETTER
"2024     Edna Muller DO  [address]  [facility] [address]    Patient: Henna Campuzano   YOB: 1990   Date of Visit: 2024       Dear Edna Muller DO,    Thank you for referring Henna Campuzano to me for evaluation. Below is a copy of my consult note.    If you have questions, please do not hesitate to call me. I look forward to following Henna along with you.         Sincerely,        Geena Decker MD        CC: No Recipients                    Maternal/Fetal Medicine Consult Note     Name: Henna Campuzano    : 1990     MRN: [id number]     Referring Provider: Edna Muller DO    Chief Complaint  T2DM, AMA, CHTN, recurrent preg loss, Hx preeclampsia,     Subjective     History of Present Illness:  Henna Campuzano is a 33 y.o.  11w5d who presents today for a dating/viability ultrasound. She is s/p low risk NIPT.     Pt denies LOF/VB.    XIOMARA: Estimated Date of Delivery: 3/5/25     ROS:   As noted in HPI.     Past Medical History:   Diagnosis Date   • Fibromyalgia    • Gestational diabetes    • Hypertension    • Rheumatoid arthritis    • Type 2 diabetes mellitus       Past Surgical History:   Procedure Laterality Date   • DILATATION AND CURETTAGE     • EAR RECONSTRUCTION     • LAPAROSCOPIC CHOLECYSTECTOMY     • MOUTH SURGERY     • NOSE SURGERY     • TONSILLECTOMY AND ADENOIDECTOMY        OB History          11    Para   1    Term   0       1    AB   9    Living   1         SAB   9    IAB   0    Ectopic   0    Molar   0    Multiple   0    Live Births   1          Obstetric Comments   FOB #1 Pregnancy #1, 2, 3  FOB #2 Pregnancy #4 SAB, D&C, #5-10  FOB #3 Pregnancy #1  current                 Objective     Vital Signs  /65   Wt 126 kg (278 lb)   Estimated body mass index is 42.27 kg/m² as calculated from the following:    Height as of 24: 172.7 cm (68\").    Weight as of this encounter: 126 kg (278 lb).    Physical " Exam  Constitutional:       Appearance: Normal appearance. She is obese.   HENT:      Head: Normocephalic and atraumatic.   Pulmonary:      Effort: Pulmonary effort is normal.   Musculoskeletal:         General: Normal range of motion.   Neurological:      General: No focal deficit present.      Mental Status: She is alert and oriented to person, place, and time.   Psychiatric:         Mood and Affect: Mood normal.         Behavior: Behavior normal.         Thought Content: Thought content normal.         Judgment: Judgment normal.     Ultrasound Impression:   Viable SIUP with CRL not measuring consistent with previously assigned dates. Size is c/w 11 weeks 5 days with XIOMARA of 3/5/2025.     Assessment and Plan     Diagnoses and all orders for this visit:    1. Antepartum multigravida of advanced maternal age (Primary)  Assessment & Plan:  S/p low risk NIPT.   NT appears normal on today's exam.      - Follow-up scheduled in 4 wks   - Will plan anatomic survey around 21wks and fetal echo around 25wks   - Pt will need serial growth ultrasounds      2. Chronic hypertension affecting pregnancy  Assessment & Plan:  Pt is currently taking Labetalol 200mgs BID and a daily ASA 81mgs. BP today is 134/65.     Her baseline preeclampsia evaluation is normal: Urine P:C ratio 0.077, serum creatinine 0.59, uric acid 3.2, AST/ALT/LDH 11//174, and platelets 210.      - Follow-up scheduled in 4wks   - Recommend q 4 wk growth ultrasounds   - Will need  testing starting at 32wks      3. Fibromyalgia    4. Morbid obesity with BMI of 40.0-44.9, adult    5. Recurrent pregnancy loss    6. Type 2 diabetes mellitus affecting pregnancy, antepartum  Assessment & Plan:  Patient sees Dr. Jones for management of her diabetes. She thinks she is getting a CGM and insulin pump today. She is currently only on Metformin 1000mgs BID.     I reviewed the goals for blood glucose in pregnancy, including fasting blood sugars < 90-95mg/dl and 2-hr  post-prandial values < 110-120mg/dl.     Patient reports that her blood sugars range between 99 and 150's.     We reviewed the complications associated with poorly controlled diabetes in pregnancy including increased risk for miscarriage, congenital abnormalities, macrosomia, need for operative delivery, damage to maternal or fetal structures at delivery, stillbirth,  complications requiring admission to the NICU, and childhood obesity and/or early development of Type 2 DM.    I would recommend starting the patient on subcutaneous insulin to achieve better blood glucose control. The addition of a CGM would be extremely helpful in allowing us to see exactly the level of control and adjust medications appropriately.      - Follow-up here scheduled in 4wks   - Anatomy survey will be at 21wks   - Fetal echo will be at approximately 25wks of pregnancy   - We will also plan on q 4 wk ultrasounds for growth   - Patient will need  testing starting at 32 wks of pregnancy      7. 11 weeks gestation of pregnancy         Follow Up  Return in about 4 weeks (around 2024).    I spent 30 minutes caring for the patient on the day of service. This included: obtaining or reviewing a separately obtained medical history, reviewing patient records, performing a medically appropriate exam and/or evaluation, counseling or educating the patient/family/caregiver, ordering medications, labs, and/or procedures and documenting such in the medical record. This does not include time spent on review and interpretation of other tests such as fetal ultrasound or the performance of other procedures such as amniocentesis or CVS.      Geena Decker MD  2024

## 2024-08-19 NOTE — PROGRESS NOTES
"Chief Complaint   Patient presents with    Diabetes     Prexisting with GDM        HPI   Henna Campuzano is a 33 y.o. female had concerns including Diabetes (Prexisting with GDM).      Here for follow-up today. I haven't received any glucose levels since her last visit. She forgot her log today. Didn't know how to use psicofxp to send.   She is checking blood sugars 4 times per day. Fasting levels ranging 70s-100. Are > 95  1-2 times per week if snacking overnight - nutrigrain bar.   BGs after meals are 150s-180s. On occasion Bgs are > 200.   Today BG was 175 1.5 hrs after Hibachi.   She is hungry often.     She is 11.5 weeks per original dates but maybe 13 weeks. Is unsure.    Has fatigue.       The following portions of the patient's history were reviewed and updated as appropriate: allergies, current medications, past family history, past medical history, past social history, past surgical history, and problem list.    Review of Systems   Constitutional:  Positive for fatigue.   Endocrine:        See HPI        /68 (BP Location: Left arm, Patient Position: Sitting, Cuff Size: Adult)   Pulse 100   Ht 172.7 cm (68\")   Wt 127 kg (280 lb)   SpO2 99%   BMI 42.57 kg/m²      Physical Exam  Vitals reviewed.   Constitutional:       Appearance: Normal appearance.   Cardiovascular:      Rate and Rhythm: Normal rate.   Pulmonary:      Effort: Pulmonary effort is normal.   Neurological:      General: No focal deficit present.      Mental Status: She is alert. Mental status is at baseline.   Psychiatric:         Mood and Affect: Mood normal.         Behavior: Behavior normal.            HbA1c:  Lab Results   Component Value Date    HGBA1C 6.0 (A) 08/19/2024    HGBA1C 5.70 (H) 08/05/2024     Glucose:  Lab Results   Component Value Date    POCGLU 175 (A) 08/19/2024         Assessment and Plan    Diagnoses and all orders for this visit:    1. Preexisting diabetes complicating pregnancy in first trimester, antepartum " (Primary)  DM2 diagnosed 16 years ago, complicated by neuropathy, A1c as high as 12 in the past.  Unfortunately she has not sent any glucose logs since her last visit a month ago. Didn't bring logs today.   Glucose levels are uncontrolled with rare fasting hyperglycemia, frequent postprandial hyperglycemia.  She has made some dietary modifications but is hungry due to trying to limit intake.  Fasting glucose most of the time in the 70-80s unless she has awoken and snacked overnight.  Start prandial insulin, Humalog 4 units before meals.  This can be taken every 3-4 hours.  Increase the dose by 2 to 4 units daily as needed until postprandial glucose levels are controlled.  There is no maximum dose of insulin.  Basal insulin is not needed at this time.  Prescription for CGM was sent to the pharmacy.  Cautioned that she needs to monitor fingersticks for accuracy.  I went through the tarun and showed her how to send logs. Send update in 1-2 days and then weekly.   -     POC Glucose, Blood  -     POC Glycosylated Hemoglobin (Hb A1C)  -     Continuous Glucose Sensor (Dexcom G7 Sensor) misc; Use 1 each Every 10 (Ten) Days.  Dispense: 9 each; Refill: 3  -     Insulin Lispro, 1 Unit Dial, (HumaLOG KwikPen) 100 UNIT/ML solution pen-injector; Inject 4 Units under the skin into the appropriate area as directed 3 (Three) Times a Day.  Dispense: 15 mL; Refill: 1  -     Insulin Pen Needle 32G X 4 MM misc; Use 1 each 4 (Four) Times a Day.  Dispense: 400 each; Refill: 3         Return in about 1 month (around 9/19/2024). The patient was instructed to contact the clinic with any interval questions or concerns.    Electronically signed by: Mandie Jones DO   Endocrinologist    Please note that portions of this note were completed with a voice recognition program.

## 2024-08-19 NOTE — PROGRESS NOTES
Patient denies bleeding, leaking fluid or cramping  NIPT low risk  Patients next follow up with Dr. Muller offices is 9/3/2024

## 2024-09-16 ENCOUNTER — OFFICE VISIT (OUTPATIENT)
Dept: OBSTETRICS AND GYNECOLOGY | Facility: HOSPITAL | Age: 34
End: 2024-09-16
Payer: COMMERCIAL

## 2024-09-16 ENCOUNTER — HOSPITAL ENCOUNTER (OUTPATIENT)
Dept: WOMENS IMAGING | Facility: HOSPITAL | Age: 34
Discharge: HOME | End: 2024-09-16
Admitting: OBSTETRICS & GYNECOLOGY
Payer: COMMERCIAL

## 2024-09-16 VITALS — WEIGHT: 285 LBS | SYSTOLIC BLOOD PRESSURE: 120 MMHG | BODY MASS INDEX: 43.33 KG/M2 | DIASTOLIC BLOOD PRESSURE: 68 MMHG

## 2024-09-16 DIAGNOSIS — Z3A.15 15 WEEKS GESTATION OF PREGNANCY: ICD-10-CM

## 2024-09-16 DIAGNOSIS — M79.7 FIBROMYALGIA: ICD-10-CM

## 2024-09-16 DIAGNOSIS — O24.119 TYPE 2 DIABETES MELLITUS AFFECTING PREGNANCY, ANTEPARTUM: ICD-10-CM

## 2024-09-16 DIAGNOSIS — O09.529 ANTEPARTUM MULTIGRAVIDA OF ADVANCED MATERNAL AGE: ICD-10-CM

## 2024-09-16 DIAGNOSIS — O10.919 CHRONIC HYPERTENSION AFFECTING PREGNANCY: ICD-10-CM

## 2024-09-16 DIAGNOSIS — E66.01 MORBID OBESITY WITH BMI OF 40.0-44.9, ADULT: ICD-10-CM

## 2024-09-16 DIAGNOSIS — O09.299 HISTORY OF PRE-ECLAMPSIA IN PRIOR PREGNANCY, CURRENTLY PREGNANT: ICD-10-CM

## 2024-09-16 DIAGNOSIS — O24.119 TYPE 2 DIABETES MELLITUS AFFECTING PREGNANCY, ANTEPARTUM: Primary | ICD-10-CM

## 2024-09-16 DIAGNOSIS — N96 RECURRENT PREGNANCY LOSS: ICD-10-CM

## 2024-09-16 PROCEDURE — 76815 OB US LIMITED FETUS(S): CPT | Performed by: OBSTETRICS & GYNECOLOGY

## 2024-09-16 PROCEDURE — 99214 OFFICE O/P EST MOD 30 MIN: CPT | Performed by: OBSTETRICS & GYNECOLOGY

## 2024-09-16 PROCEDURE — 76815 OB US LIMITED FETUS(S): CPT

## 2024-09-18 ENCOUNTER — OFFICE VISIT (OUTPATIENT)
Dept: ENDOCRINOLOGY | Facility: CLINIC | Age: 34
End: 2024-09-18
Payer: COMMERCIAL

## 2024-09-18 VITALS
SYSTOLIC BLOOD PRESSURE: 116 MMHG | OXYGEN SATURATION: 96 % | HEIGHT: 68 IN | HEART RATE: 110 BPM | RESPIRATION RATE: 14 BRPM | WEIGHT: 286 LBS | BODY MASS INDEX: 43.35 KG/M2 | DIASTOLIC BLOOD PRESSURE: 64 MMHG

## 2024-09-18 DIAGNOSIS — O24.312 PREEXISTING DIABETES COMPLICATING PREGNANCY IN SECOND TRIMESTER, ANTEPARTUM: Primary | ICD-10-CM

## 2024-09-18 LAB
EXPIRATION DATE: ABNORMAL
GLUCOSE BLDC GLUCOMTR-MCNC: 151 MG/DL (ref 70–130)
Lab: ABNORMAL

## 2024-09-18 PROCEDURE — 99214 OFFICE O/P EST MOD 30 MIN: CPT | Performed by: INTERNAL MEDICINE

## 2024-09-18 PROCEDURE — 95251 CONT GLUC MNTR ANALYSIS I&R: CPT | Performed by: INTERNAL MEDICINE

## 2024-09-18 PROCEDURE — 3044F HG A1C LEVEL LT 7.0%: CPT | Performed by: INTERNAL MEDICINE

## 2024-09-18 PROCEDURE — 1159F MED LIST DOCD IN RCRD: CPT | Performed by: INTERNAL MEDICINE

## 2024-09-18 PROCEDURE — 1160F RVW MEDS BY RX/DR IN RCRD: CPT | Performed by: INTERNAL MEDICINE

## 2024-09-18 RX ORDER — INSULIN LISPRO 100 [IU]/ML
12 INJECTION, SOLUTION INTRAVENOUS; SUBCUTANEOUS 3 TIMES DAILY
Qty: 15 ML | Refills: 1 | Status: SHIPPED | OUTPATIENT
Start: 2024-09-18

## 2024-10-14 ENCOUNTER — HOSPITAL ENCOUNTER (OUTPATIENT)
Dept: WOMENS IMAGING | Facility: HOSPITAL | Age: 34
Discharge: HOME | End: 2024-10-14
Admitting: OBSTETRICS & GYNECOLOGY
Payer: COMMERCIAL

## 2024-10-14 ENCOUNTER — OFFICE VISIT (OUTPATIENT)
Dept: OBSTETRICS AND GYNECOLOGY | Facility: HOSPITAL | Age: 34
End: 2024-10-14
Payer: COMMERCIAL

## 2024-10-14 VITALS — DIASTOLIC BLOOD PRESSURE: 65 MMHG | WEIGHT: 287 LBS | BODY MASS INDEX: 43.64 KG/M2 | SYSTOLIC BLOOD PRESSURE: 135 MMHG

## 2024-10-14 DIAGNOSIS — N96 RECURRENT PREGNANCY LOSS: ICD-10-CM

## 2024-10-14 DIAGNOSIS — O10.919 CHRONIC HYPERTENSION AFFECTING PREGNANCY: ICD-10-CM

## 2024-10-14 DIAGNOSIS — O09.529 ANTEPARTUM MULTIGRAVIDA OF ADVANCED MATERNAL AGE: ICD-10-CM

## 2024-10-14 DIAGNOSIS — O24.119 TYPE 2 DIABETES MELLITUS AFFECTING PREGNANCY, ANTEPARTUM: Primary | ICD-10-CM

## 2024-10-14 DIAGNOSIS — O24.119 TYPE 2 DIABETES MELLITUS AFFECTING PREGNANCY, ANTEPARTUM: ICD-10-CM

## 2024-10-14 DIAGNOSIS — O09.299 HISTORY OF PRE-ECLAMPSIA IN PRIOR PREGNANCY, CURRENTLY PREGNANT: ICD-10-CM

## 2024-10-14 DIAGNOSIS — E66.01 MORBID OBESITY WITH BMI OF 40.0-44.9, ADULT: ICD-10-CM

## 2024-10-14 PROCEDURE — 76811 OB US DETAILED SNGL FETUS: CPT

## 2024-10-14 PROCEDURE — 76811 OB US DETAILED SNGL FETUS: CPT | Performed by: OBSTETRICS & GYNECOLOGY

## 2024-10-14 PROCEDURE — 99213 OFFICE O/P EST LOW 20 MIN: CPT | Performed by: OBSTETRICS & GYNECOLOGY

## 2024-10-14 NOTE — PROGRESS NOTES
"    Maternal/Fetal Medicine Follow Up Note     Name: Henna Campuzano    : 1990     MRN: [id number]     Referring Provider: Edna Muller DO    Chief Complaint  AMA; T2DM; CHTN; Hx PTD-34 wks; MO    Subjective     History of Present Illness:  Henna Campuzano is a 33 y.o.  19w5d who presents today for follow up   Follows with Endocrinology   Continues to titrate insulin   Struggling most with fastings   Taking low dose ASA and Nifedipine   NIPS low risk     XIOMARA: Estimated Date of Delivery: 3/5/25     ROS:   As noted in HPI.     Objective     Vital Signs  /65   Wt 130 kg (287 lb)   Estimated body mass index is 43.64 kg/m² as calculated from the following:    Height as of 24: 172.7 cm (68\").    Weight as of this encounter: 130 kg (287 lb).    Ultrasound Impression:   See viewpoint      Assessment and Plan     Henna Campuzano is a 33 y.o.  19w5d     Diagnoses and all orders for this visit:    1. Type 2 diabetes mellitus affecting pregnancy, antepartum (Primary)  Assessment & Plan:  Patient with T2DM followed by Endocrinology   Still with suboptimal control   Insulin being titrated   Reviewed glycemic goals   Ultrasound today appears reassuring though anatomic views are limited   Will need fetal echocardiogram       2. History of pre-eclampsia in prior pregnancy, currently pregnant    3. Recurrent pregnancy loss    4. Chronic hypertension affecting pregnancy  Assessment & Plan:  BP well controlled on current regimen.   Taking low dose ASA   Baseline p:c ratio normal   Follow up 4 weeks scheduled       5. Antepartum multigravida of advanced maternal age         Follow Up  4 weeks     I spent 20 minutes caring for the patient on the day of service. This included: obtaining or reviewing a separately obtained medical history, reviewing patient records, performing a medically appropriate exam and/or evaluation, counseling or educating the patient/family/caregiver, ordering medications, labs, " and/or procedures and documenting such in the medical record. This does not include time spent on review and interpretation of other tests such as fetal ultrasound or the performance of other procedures such as amniocentesis or CVS.    Carmelina Bautista MD FACOG  Maternal Fetal Medicine, [facility]     10/14/2024

## 2024-10-14 NOTE — PROGRESS NOTES
Patient denies any leaking fluid, bleeding, or contractions  NIPT negative  Patient states follow up with Dr. Muller' office is 10/16.

## 2024-10-14 NOTE — LETTER
October 14, 2024     Edna Muller DO  [address]  [place] [place] [facility] [address]    Patient: Henna Campuzano   YOB: 1990   Date of Visit: 10/14/2024       Dear Edna Muller DO,    Thank you for referring Henna Campuzano to me for evaluation. Below is a copy of my consult note.    If you have questions, please do not hesitate to call me. I look forward to following Henna along with you.         Sincerely,        Carmelina Bautista MD        CC: No Recipients    Patient denies any leaking fluid, bleeding, or contractions  NIPT negative  Patient states follow up with Dr. Muller' office is 10/16.

## 2024-10-14 NOTE — ASSESSMENT & PLAN NOTE
BP well controlled on current regimen.   Taking low dose ASA   Baseline p:c ratio normal   Follow up 4 weeks scheduled

## 2024-10-14 NOTE — ASSESSMENT & PLAN NOTE
Patient with T2DM followed by Endocrinology   Still with suboptimal control   Insulin being titrated   Reviewed glycemic goals   Ultrasound today appears reassuring though anatomic views are limited   Will need fetal echocardiogram

## 2024-11-18 ENCOUNTER — HOSPITAL ENCOUNTER (OUTPATIENT)
Dept: WOMENS IMAGING | Facility: HOSPITAL | Age: 34
Discharge: HOME | End: 2024-11-18
Admitting: OBSTETRICS & GYNECOLOGY
Payer: COMMERCIAL

## 2024-11-18 ENCOUNTER — OFFICE VISIT (OUTPATIENT)
Dept: OBSTETRICS AND GYNECOLOGY | Facility: HOSPITAL | Age: 34
End: 2024-11-18
Payer: COMMERCIAL

## 2024-11-18 VITALS — BODY MASS INDEX: 43.94 KG/M2 | DIASTOLIC BLOOD PRESSURE: 71 MMHG | WEIGHT: 289 LBS | SYSTOLIC BLOOD PRESSURE: 135 MMHG

## 2024-11-18 DIAGNOSIS — O09.529 ANTEPARTUM MULTIGRAVIDA OF ADVANCED MATERNAL AGE: ICD-10-CM

## 2024-11-18 DIAGNOSIS — O09.299 HISTORY OF PRE-ECLAMPSIA IN PRIOR PREGNANCY, CURRENTLY PREGNANT: ICD-10-CM

## 2024-11-18 DIAGNOSIS — N96 RECURRENT PREGNANCY LOSS: ICD-10-CM

## 2024-11-18 DIAGNOSIS — O24.119 TYPE 2 DIABETES MELLITUS AFFECTING PREGNANCY, ANTEPARTUM: ICD-10-CM

## 2024-11-18 DIAGNOSIS — Z3A.24 24 WEEKS GESTATION OF PREGNANCY: ICD-10-CM

## 2024-11-18 DIAGNOSIS — O24.119 TYPE 2 DIABETES MELLITUS AFFECTING PREGNANCY, ANTEPARTUM: Primary | ICD-10-CM

## 2024-11-18 DIAGNOSIS — O10.919 CHRONIC HYPERTENSION AFFECTING PREGNANCY: ICD-10-CM

## 2024-11-18 DIAGNOSIS — E66.01 MORBID OBESITY WITH BMI OF 40.0-44.9, ADULT: ICD-10-CM

## 2024-11-18 PROCEDURE — 93325 DOPPLER ECHO COLOR FLOW MAPG: CPT

## 2024-11-18 PROCEDURE — 76825 ECHO EXAM OF FETAL HEART: CPT

## 2024-11-18 PROCEDURE — 76816 OB US FOLLOW-UP PER FETUS: CPT

## 2024-11-18 NOTE — PROGRESS NOTES
Patient denies any leaking fluid or bleeding. States have not felt baby move in 3 days and is having cramps low in pelvis.  Patient states when lays down, room begins to spin and feels exteremly nauseous   NIPT negative  Patient states follow up with Dr. Muller' office is today.

## 2024-11-18 NOTE — LETTER
2024     Edan Muller DO  [address]  [facility] [place] [facility] [address]    Patient: Henna Campuzano   YOB: 1990   Date of Visit: 2024       Dear Edna Muller DO,    Thank you for referring Henna Campuzano to me for evaluation. Below is a copy of my consult note.    If you have questions, please do not hesitate to call me. I look forward to following Henna along with you.         Sincerely,        Geena Decker MD        CC: No Recipients                      Maternal/Fetal Medicine Consult Note     Name: Henna Campuzano    : 1990     MRN: [id number]     Referring Provider: Edna Muller DO    Chief Complaint  T2DM; AMA; CHTN    Subjective     History of Present Illness:  Henna Campuzano is a 33 y.o.  25w3d who presents today for a follow-up ultrasound to complete the anatomic survey, for a fetal echo, and to assess interval growth.     Pt denies LOF/VB/ctx's. She has been sick for almost a month which started with COVID on a recent cruise. She reports that she is coughing up green sputum.     XIOMARA: Estimated Date of Delivery: 3/5/25     ROS:   As noted in HPI.     Past Medical History:   Diagnosis Date   • Fibromyalgia    • Gestational diabetes    • Hypertension    • Rheumatoid arthritis    • Type 2 diabetes mellitus       Past Surgical History:   Procedure Laterality Date   • DILATATION AND CURETTAGE     • EAR RECONSTRUCTION     • LAPAROSCOPIC CHOLECYSTECTOMY     • MOUTH SURGERY     • NOSE SURGERY     • TONSILLECTOMY AND ADENOIDECTOMY        OB History          11    Para   1    Term   0       1    AB   9    Living   1         SAB   9    IAB   0    Ectopic   0    Molar   0    Multiple   0    Live Births   1          Obstetric Comments   FOB #1 Pregnancy #1, 2, 3  FOB #2 Pregnancy #4 SAB, D&C, #5-10  FOB #3 Pregnancy #1  current  9 miscarriages                 Objective     Vital Signs  /71   Wt 131 kg (289 lb)   Estimated body  "mass index is 43.94 kg/m² as calculated from the following:    Height as of 9/18/24: 172.7 cm (68\").    Weight as of this encounter: 131 kg (289 lb).    Physical Exam  Constitutional:       Appearance: Normal appearance. She is normal weight. She is obese.   HENT:      Head: Normocephalic and atraumatic.   Pulmonary:      Effort: Pulmonary effort is normal.   Musculoskeletal:         General: Normal range of motion.   Neurological:      General: No focal deficit present.      Mental Status: She is alert and oriented to person, place, and time.   Psychiatric:         Mood and Affect: Mood normal.         Behavior: Behavior normal.         Thought Content: Thought content normal.         Judgment: Judgment normal.     Ultrasound Impression:   S=D, nl fluid, nl placenta, nl CL, nl anatomy and echo.    Assessment and Plan     Diagnoses and all orders for this visit:    1. Type 2 diabetes mellitus affecting pregnancy, antepartum (Primary)  Assessment & Plan:  Per patient, her Type 2 DM is being managed by Dr. Jones. She has an appointment there today.     She reports that her BS's were in the 250's this morning.     I reviewed the goals for blood sugar in pregnancy and the importance of working to keep them in range, 63mg/dl to 140mg/dl.      - Again offered to manage patient's blood glucose with weekly reviews   - Follow-up scheduled in 4wks      2. Recurrent pregnancy loss    3. Morbid obesity with BMI of 40.0-44.9, adult    4. History of pre-eclampsia in prior pregnancy, currently pregnant    5. Chronic hypertension affecting pregnancy  Assessment & Plan:  Pt currently taking Nifedipine prn and ASA 81mgs daily.     She is s/p normal baseline preeclampsia labs.     - Encouraged patient to take anti-hypertensives as prescribed   - Follow-up for growth scheduled in 4wks      6. Antepartum multigravida of advanced maternal age  Assessment & Plan:  S/p low risk NIPT. S/p normal NT. Now s/p normal anatomy.     Echo normal " today.      - Follow-up scheduled in 4 wks        7. 24 weeks gestation of pregnancy         Follow Up  Return in about 4 weeks (around 12/16/2024).  Recommend patient either be evaluated by Dr. Muller or in Triage for pneumonia.     I spent 10 minutes caring for the patient on the day of service. This included: obtaining or reviewing a separately obtained medical history, reviewing patient records, performing a medically appropriate exam and/or evaluation, counseling or educating the patient/family/caregiver, ordering medications, labs, and/or procedures and documenting such in the medical record. This does not include time spent on review and interpretation of other tests such as fetal ultrasound or the performance of other procedures such as amniocentesis or CVS.      Geena Decker MD  11/18/2024

## 2024-11-23 NOTE — ASSESSMENT & PLAN NOTE
S/p low risk NIPT. S/p normal NT. Now s/p normal anatomy.     Echo normal today.      - Follow-up scheduled in 4 wks

## 2024-11-23 NOTE — ASSESSMENT & PLAN NOTE
Per patient, her Type 2 DM is being managed by Dr. Jones. She has an appointment there today.     She reports that her BS's were in the 250's this morning.     I reviewed the goals for blood sugar in pregnancy and the importance of working to keep them in range, 63mg/dl to 140mg/dl.      - Again offered to manage patient's blood glucose with weekly reviews   - Follow-up scheduled in 4wks

## 2024-11-23 NOTE — ASSESSMENT & PLAN NOTE
Pt currently taking Nifedipine prn and ASA 81mgs daily.     She is s/p normal baseline preeclampsia labs.     - Encouraged patient to take anti-hypertensives as prescribed   - Follow-up for growth scheduled in 4wks

## 2024-11-23 NOTE — PROGRESS NOTES
"    Maternal/Fetal Medicine Consult Note     Name: Henna Campuzano    : 1990     MRN: [id number]     Referring Provider: Edna Muller DO    Chief Complaint  T2DM; AMA; CHTN    Subjective     History of Present Illness:  Henna Campuzano is a 33 y.o.  25w3d who presents today for a follow-up ultrasound to complete the anatomic survey, for a fetal echo, and to assess interval growth.     Pt denies LOF/VB/ctx's. She has been sick for almost a month which started with COVID on a recent cruise. She reports that she is coughing up green sputum.     XIOMARA: Estimated Date of Delivery: 3/5/25     ROS:   As noted in HPI.     Past Medical History:   Diagnosis Date    Fibromyalgia     Gestational diabetes     Hypertension     Rheumatoid arthritis     Type 2 diabetes mellitus       Past Surgical History:   Procedure Laterality Date    DILATATION AND CURETTAGE      EAR RECONSTRUCTION      LAPAROSCOPIC CHOLECYSTECTOMY      MOUTH SURGERY      NOSE SURGERY      TONSILLECTOMY AND ADENOIDECTOMY        OB History          11    Para   1    Term   0       1    AB   9    Living   1         SAB   9    IAB   0    Ectopic   0    Molar   0    Multiple   0    Live Births   1          Obstetric Comments   FOB #1 Pregnancy #1, 2, 3  FOB #2 Pregnancy #4 SAB, D&C, #5-10  FOB #3 Pregnancy #1  current  9 miscarriages                 Objective     Vital Signs  /71   Wt 131 kg (289 lb)   Estimated body mass index is 43.94 kg/m² as calculated from the following:    Height as of 24: 172.7 cm (68\").    Weight as of this encounter: 131 kg (289 lb).    Physical Exam  Constitutional:       Appearance: Normal appearance. She is normal weight. She is obese.   HENT:      Head: Normocephalic and atraumatic.   Pulmonary:      Effort: Pulmonary effort is normal.   Musculoskeletal:         General: Normal range of motion.   Neurological:      General: No focal deficit present.      Mental Status: She is alert and oriented " to person, place, and time.   Psychiatric:         Mood and Affect: Mood normal.         Behavior: Behavior normal.         Thought Content: Thought content normal.         Judgment: Judgment normal.     Ultrasound Impression:   S=D, nl fluid, nl placenta, nl CL, nl anatomy and echo.    Assessment and Plan     Diagnoses and all orders for this visit:    1. Type 2 diabetes mellitus affecting pregnancy, antepartum (Primary)  Assessment & Plan:  Per patient, her Type 2 DM is being managed by Dr. Jones. She has an appointment there today.     She reports that her BS's were in the 250's this morning.     I reviewed the goals for blood sugar in pregnancy and the importance of working to keep them in range, 63mg/dl to 140mg/dl.      - Again offered to manage patient's blood glucose with weekly reviews   - Follow-up scheduled in 4wks      2. Recurrent pregnancy loss    3. Morbid obesity with BMI of 40.0-44.9, adult    4. History of pre-eclampsia in prior pregnancy, currently pregnant    5. Chronic hypertension affecting pregnancy  Assessment & Plan:  Pt currently taking Nifedipine prn and ASA 81mgs daily.     She is s/p normal baseline preeclampsia labs.     - Encouraged patient to take anti-hypertensives as prescribed   - Follow-up for growth scheduled in 4wks      6. Antepartum multigravida of advanced maternal age  Assessment & Plan:  S/p low risk NIPT. S/p normal NT. Now s/p normal anatomy.     Echo normal today.      - Follow-up scheduled in 4 wks        7. 24 weeks gestation of pregnancy         Follow Up  Return in about 4 weeks (around 12/16/2024).  Recommend patient either be evaluated by Dr. Muller or in Triage for pneumonia.     I spent 10 minutes caring for the patient on the day of service. This included: obtaining or reviewing a separately obtained medical history, reviewing patient records, performing a medically appropriate exam and/or evaluation, counseling or educating the patient/family/caregiver,  ordering medications, labs, and/or procedures and documenting such in the medical record. This does not include time spent on review and interpretation of other tests such as fetal ultrasound or the performance of other procedures such as amniocentesis or CVS.      Geena Decker MD  11/18/2024

## 2024-12-03 ENCOUNTER — TELEPHONE (OUTPATIENT)
Dept: OBSTETRICS AND GYNECOLOGY | Facility: HOSPITAL | Age: 34
End: 2024-12-03
Payer: COMMERCIAL

## 2024-12-03 ENCOUNTER — TRANSCRIBE ORDERS (OUTPATIENT)
Dept: GENERAL RADIOLOGY | Facility: HOSPITAL | Age: 34
End: 2024-12-03
Payer: COMMERCIAL

## 2024-12-03 ENCOUNTER — HOSPITAL ENCOUNTER (OUTPATIENT)
Dept: GENERAL RADIOLOGY | Facility: HOSPITAL | Age: 34
Discharge: HOME | End: 2024-12-03
Admitting: OBSTETRICS & GYNECOLOGY
Payer: COMMERCIAL

## 2024-12-03 DIAGNOSIS — R05.9 COUGH, UNSPECIFIED TYPE: Primary | ICD-10-CM

## 2024-12-03 DIAGNOSIS — R05.9 COUGH, UNSPECIFIED TYPE: ICD-10-CM

## 2024-12-03 PROCEDURE — 71046 X-RAY EXAM CHEST 2 VIEWS: CPT

## 2024-12-03 NOTE — TELEPHONE ENCOUNTER
Spoke with patient over the phone.  Patient states she is returning my call.  Patient confirms that she has been prescribed lantus 10 units at night but is actually taking 14-18 units depending on her blood sugar, and she is prescribed Lispro 12 units with meals TID  but states she has been taking 20 units with meals and that her blood sugars are still going as high as 300.   Patient is concerned about her high readings and states that when she sees endo she feels she is being fussed at instead of being instructed on how to manage her blood sugar.  Informed patient would send instructions through [employer] on how to enter in her insulin and dosages, how to calibrate and how to enter in her meals on her Dexcom tarun.  Also informed patient I have already sent our clinic code to her and instructions on how to enter that through [employer].  Patient voices understanding.  Encourage patient to call or message through [employer] with any questions.  Cara Schwartz RN

## 2024-12-03 NOTE — TELEPHONE ENCOUNTER
Attempted to reach patient by phone.  Message received that the call could not be completed at this time and to please try again later.  Received message from Dr. Villar stating she had spoken with Dr. Decker and requested I reach out to patient regarding her diabetes.  I will send patient a [employer] message as well.  Cara Schwartz RN

## 2024-12-10 ENCOUNTER — HOSPITAL ENCOUNTER (INPATIENT)
Facility: HOSPITAL | Age: 34
LOS: 1 days | Discharge: HOME | End: 2024-12-12
Attending: STUDENT IN AN ORGANIZED HEALTH CARE EDUCATION/TRAINING PROGRAM | Admitting: STUDENT IN AN ORGANIZED HEALTH CARE EDUCATION/TRAINING PROGRAM
Payer: COMMERCIAL

## 2024-12-10 LAB
ALBUMIN SERPL-MCNC: 3.2 G/DL (ref 3.5–5.2)
ALBUMIN/GLOB SERPL: 1.1 G/DL
ALP SERPL-CCNC: 90 U/L (ref 39–117)
ALT SERPL W P-5'-P-CCNC: 7 U/L (ref 1–33)
ANION GAP SERPL CALCULATED.3IONS-SCNC: 14 MMOL/L (ref 5–15)
AST SERPL-CCNC: 8 U/L (ref 1–32)
BACTERIA UR QL AUTO: ABNORMAL /HPF
BILIRUB SERPL-MCNC: <0.2 MG/DL (ref 0–1.2)
BILIRUB UR QL STRIP: NEGATIVE
BUN SERPL-MCNC: 8 MG/DL (ref 6–20)
BUN/CREAT SERPL: 14.8 (ref 7–25)
CALCIUM SPEC-SCNC: 8.9 MG/DL (ref 8.6–10.5)
CHLORIDE SERPL-SCNC: 102 MMOL/L (ref 98–107)
CLARITY UR: ABNORMAL
CO2 SERPL-SCNC: 21 MMOL/L (ref 22–29)
COLOR UR: YELLOW
CREAT SERPL-MCNC: 0.54 MG/DL (ref 0.57–1)
DEPRECATED RDW RBC AUTO: 43.6 FL (ref 37–54)
EGFRCR SERPLBLD CKD-EPI 2021: 122.5 ML/MIN/1.73
ERYTHROCYTE [DISTWIDTH] IN BLOOD BY AUTOMATED COUNT: 13.1 % (ref 12.3–15.4)
GLOBULIN UR ELPH-MCNC: 2.9 GM/DL
GLUCOSE BLDC GLUCOMTR-MCNC: 157 MG/DL (ref 70–130)
GLUCOSE BLDC GLUCOMTR-MCNC: 169 MG/DL (ref 70–130)
GLUCOSE SERPL-MCNC: 184 MG/DL (ref 65–99)
GLUCOSE UR STRIP-MCNC: ABNORMAL MG/DL
HBA1C MFR BLD: 6.2 % (ref 4.8–5.6)
HCT VFR BLD AUTO: 34.8 % (ref 34–46.6)
HGB BLD-MCNC: 11.8 G/DL (ref 12–15.9)
HGB UR QL STRIP.AUTO: NEGATIVE
HYALINE CASTS UR QL AUTO: ABNORMAL /LPF
KETONES UR QL STRIP: ABNORMAL
LEUKOCYTE ESTERASE UR QL STRIP.AUTO: NEGATIVE
MCH RBC QN AUTO: 31.1 PG (ref 26.6–33)
MCHC RBC AUTO-ENTMCNC: 33.9 G/DL (ref 31.5–35.7)
MCV RBC AUTO: 91.6 FL (ref 79–97)
NITRITE UR QL STRIP: NEGATIVE
PH UR STRIP.AUTO: 6 [PH] (ref 5–8)
PLATELET # BLD AUTO: 203 10*3/MM3 (ref 140–450)
PMV BLD AUTO: 11.3 FL (ref 6–12)
POTASSIUM SERPL-SCNC: 3.9 MMOL/L (ref 3.5–5.2)
PROT SERPL-MCNC: 6.1 G/DL (ref 6–8.5)
PROT UR QL STRIP: ABNORMAL
RBC # BLD AUTO: 3.8 10*6/MM3 (ref 3.77–5.28)
RBC # UR STRIP: ABNORMAL /HPF
REF LAB TEST METHOD: ABNORMAL
SODIUM SERPL-SCNC: 137 MMOL/L (ref 136–145)
SP GR UR STRIP: 1.03 (ref 1–1.03)
SQUAMOUS #/AREA URNS HPF: ABNORMAL /HPF
UROBILINOGEN UR QL STRIP: ABNORMAL
WBC # UR STRIP: ABNORMAL /HPF
WBC NRBC COR # BLD AUTO: 15.71 10*3/MM3 (ref 3.4–10.8)

## 2024-12-10 PROCEDURE — 83036 HEMOGLOBIN GLYCOSYLATED A1C: CPT | Performed by: OBSTETRICS & GYNECOLOGY

## 2024-12-10 PROCEDURE — 80053 COMPREHEN METABOLIC PANEL: CPT | Performed by: OBSTETRICS & GYNECOLOGY

## 2024-12-10 PROCEDURE — G0463 HOSPITAL OUTPT CLINIC VISIT: HCPCS

## 2024-12-10 PROCEDURE — 63710000001 INSULIN GLARGINE PER 5 UNITS: Performed by: OBSTETRICS & GYNECOLOGY

## 2024-12-10 PROCEDURE — 99221 1ST HOSP IP/OBS SF/LOW 40: CPT | Performed by: OBSTETRICS & GYNECOLOGY

## 2024-12-10 PROCEDURE — 59025 FETAL NON-STRESS TEST: CPT

## 2024-12-10 PROCEDURE — 63710000001 INSULIN LISPRO (HUMAN) PER 5 UNITS: Performed by: OBSTETRICS & GYNECOLOGY

## 2024-12-10 PROCEDURE — 82948 REAGENT STRIP/BLOOD GLUCOSE: CPT

## 2024-12-10 PROCEDURE — 85027 COMPLETE CBC AUTOMATED: CPT | Performed by: OBSTETRICS & GYNECOLOGY

## 2024-12-10 PROCEDURE — 81001 URINALYSIS AUTO W/SCOPE: CPT | Performed by: OBSTETRICS & GYNECOLOGY

## 2024-12-10 RX ORDER — NIFEDIPINE 30 MG/1
30 TABLET, EXTENDED RELEASE ORAL
Status: DISCONTINUED | OUTPATIENT
Start: 2024-12-11 | End: 2024-12-11

## 2024-12-10 RX ORDER — IBUPROFEN 600 MG/1
1 TABLET ORAL
Status: DISCONTINUED | OUTPATIENT
Start: 2024-12-10 | End: 2024-12-12 | Stop reason: HOSPADM

## 2024-12-10 RX ORDER — ASPIRIN 81 MG/1
81 TABLET, CHEWABLE ORAL DAILY
Status: DISCONTINUED | OUTPATIENT
Start: 2024-12-10 | End: 2024-12-11

## 2024-12-10 RX ORDER — INSULIN LISPRO 100 [IU]/ML
2-9 INJECTION, SOLUTION INTRAVENOUS; SUBCUTANEOUS
Status: DISCONTINUED | OUTPATIENT
Start: 2024-12-10 | End: 2024-12-12 | Stop reason: HOSPADM

## 2024-12-10 RX ORDER — PRENATAL VIT/IRON FUM/FOLIC AC 27MG-0.8MG
1 TABLET ORAL DAILY
Status: DISCONTINUED | OUTPATIENT
Start: 2024-12-10 | End: 2024-12-12 | Stop reason: HOSPADM

## 2024-12-10 RX ORDER — NICOTINE POLACRILEX 4 MG
15 LOZENGE BUCCAL
Status: DISCONTINUED | OUTPATIENT
Start: 2024-12-10 | End: 2024-12-12 | Stop reason: HOSPADM

## 2024-12-10 RX ORDER — INSULIN LISPRO 100 [IU]/ML
20 INJECTION, SOLUTION INTRAVENOUS; SUBCUTANEOUS
Status: DISCONTINUED | OUTPATIENT
Start: 2024-12-10 | End: 2024-12-11

## 2024-12-10 RX ORDER — DEXTROSE MONOHYDRATE 25 G/50ML
25 INJECTION, SOLUTION INTRAVENOUS
Status: DISCONTINUED | OUTPATIENT
Start: 2024-12-10 | End: 2024-12-12 | Stop reason: HOSPADM

## 2024-12-10 RX ADMIN — ASPIRIN 81 MG 81 MG: 81 TABLET ORAL at 20:55

## 2024-12-10 RX ADMIN — INSULIN GLARGINE 18 UNITS: 100 INJECTION, SOLUTION SUBCUTANEOUS at 20:55

## 2024-12-10 RX ADMIN — PRENATAL VITAMINS-IRON FUMARATE 27 MG IRON-FOLIC ACID 0.8 MG TABLET 1 TABLET: at 20:55

## 2024-12-10 RX ADMIN — INSULIN LISPRO 20 UNITS: 100 INJECTION, SOLUTION INTRAVENOUS; SUBCUTANEOUS at 19:39

## 2024-12-10 RX ADMIN — INSULIN LISPRO 2 UNITS: 100 INJECTION, SOLUTION INTRAVENOUS; SUBCUTANEOUS at 22:30

## 2024-12-11 ENCOUNTER — APPOINTMENT (OUTPATIENT)
Dept: WOMENS IMAGING | Facility: HOSPITAL | Age: 34
End: 2024-12-11
Payer: COMMERCIAL

## 2024-12-11 PROBLEM — O24.419 GESTATIONAL DIABETES: Status: ACTIVE | Noted: 2024-12-11

## 2024-12-11 LAB
GLUCOSE BLDC GLUCOMTR-MCNC: 113 MG/DL (ref 70–130)
GLUCOSE BLDC GLUCOMTR-MCNC: 118 MG/DL (ref 70–130)
GLUCOSE BLDC GLUCOMTR-MCNC: 156 MG/DL (ref 70–130)
GLUCOSE BLDC GLUCOMTR-MCNC: 192 MG/DL (ref 70–130)

## 2024-12-11 PROCEDURE — 76816 OB US FOLLOW-UP PER FETUS: CPT | Performed by: OBSTETRICS & GYNECOLOGY

## 2024-12-11 PROCEDURE — 82948 REAGENT STRIP/BLOOD GLUCOSE: CPT

## 2024-12-11 PROCEDURE — 63710000001 INSULIN GLARGINE PER 5 UNITS: Performed by: OBSTETRICS & GYNECOLOGY

## 2024-12-11 PROCEDURE — 76816 OB US FOLLOW-UP PER FETUS: CPT

## 2024-12-11 PROCEDURE — 63710000001 INSULIN LISPRO (HUMAN) PER 5 UNITS: Performed by: OBSTETRICS & GYNECOLOGY

## 2024-12-11 RX ORDER — INSULIN LISPRO 100 [IU]/ML
24 INJECTION, SOLUTION INTRAVENOUS; SUBCUTANEOUS
Status: DISCONTINUED | OUTPATIENT
Start: 2024-12-11 | End: 2024-12-12

## 2024-12-11 RX ORDER — ACETAMINOPHEN 500 MG
1000 TABLET ORAL EVERY 6 HOURS PRN
Status: DISCONTINUED | OUTPATIENT
Start: 2024-12-11 | End: 2024-12-12 | Stop reason: HOSPADM

## 2024-12-11 RX ADMIN — INSULIN GLARGINE 20 UNITS: 100 INJECTION, SOLUTION SUBCUTANEOUS at 21:33

## 2024-12-11 RX ADMIN — ACETAMINOPHEN 1000 MG: 500 TABLET ORAL at 08:48

## 2024-12-11 RX ADMIN — INSULIN LISPRO 2 UNITS: 100 INJECTION, SOLUTION INTRAVENOUS; SUBCUTANEOUS at 21:39

## 2024-12-11 RX ADMIN — INSULIN LISPRO 24 UNITS: 100 INJECTION, SOLUTION INTRAVENOUS; SUBCUTANEOUS at 12:25

## 2024-12-11 RX ADMIN — INSULIN LISPRO 24 UNITS: 100 INJECTION, SOLUTION INTRAVENOUS; SUBCUTANEOUS at 18:19

## 2024-12-11 RX ADMIN — INSULIN LISPRO 2 UNITS: 100 INJECTION, SOLUTION INTRAVENOUS; SUBCUTANEOUS at 18:20

## 2024-12-11 RX ADMIN — PRENATAL VITAMINS-IRON FUMARATE 27 MG IRON-FOLIC ACID 0.8 MG TABLET 1 TABLET: at 21:33

## 2024-12-11 RX ADMIN — INSULIN LISPRO 20 UNITS: 100 INJECTION, SOLUTION INTRAVENOUS; SUBCUTANEOUS at 08:18

## 2024-12-11 NOTE — PROGRESS NOTES
DALILA Mcgowan  Antepartum Progress Note    Chief Complaint: HD 2    Subjective   Feels well overall. Concerned that diet here is not the same as what she eats at home. No other complaints. + FM no VB      Objective     Vital Signs Range for the last 24 hours  Temp:  [98.1 °F (36.7 °C)-98.5 °F (36.9 °C)] 98.1 °F (36.7 °C)   BP: (115-132)/(56-72) 115/56   Heart Rate:  [] 92   Resp:  [16] 16                 Fetal Heart Rate Assessment   Method: Fetal HR Assessment Method: external   Beats/min: Fetal HR (beats/min): 150   Baseline: Fetal HR Baseline: normal range   Variability: Fetal HR Variability: moderate (amplitude range 6 to 25 bpm)   Accels: Fetal HR Accelerations: greater than/equal to 15 bpm, lasting at least 15 seconds   Decels: Fetal HR Decelerations: absent   Tracing Category:       Uterine Assessment   Method: Method: external tocotransducer   Frequency (min):     Ctx Count in 10 min:     Duration:     Intensity: Contraction Intensity: no contractions   Intensity by IUPC:     Resting Tone:     Resting Tone by IUPC:              Intake/Output last 24 hours:    No intake or output data in the 24 hours ending 24 0845    Intake/Output this shift:    No intake/output data recorded.    Physical Exam:  General: Patient is comfortable and well appearing   Heart CVS exam: normal rate and regular rhythm.   Lungs Chest: clear to auscultation, no wheezes, rales or rhonchi, symmetric air entry.     Abdomen S/gravid/NT   Extremities No edema       Laboratory Results  CBC:      Lab 12/10/24  1724   WBC 15.71*   HEMOGLOBIN 11.8*   HEMATOCRIT 34.8   PLATELETS 203   MCV 91.6     Blood Sugar in Office          2024    15:02 12/10/2024    19:34 12/10/2024    22:25 2024    07:51   Blood Sugar in Office   Glucose 151  169  157  118         Ultrasound Results: [facility] [[id number]] (Order [id number])  Order  Status: Final result     Study Result    PAT NAME: ALEKSANDER SAQIB  MED REC#:  [id number]  BIRTH DA: [id number]  PAT GEND: F  ACCOUNT#: [id number]  PAT TYPE: O  EXAM FLORENTIN: [id number]  REF PHYS LILIANA CEBALLOS  ACCESSION [id number]        Comparison Studies  =================     The findings of this study are compared to the prior ultrasound study dated 11/18/24        Patient Status  ============     Inpatient        Indication  ========     Type 2 DM, (poor control) Decreased fetal movement, UTI        Maternal Assessment  ==================     Etqcmx489 cm  Height (ft)5 ft  Height (in)8 in  Xfueuf977 kg  Weight (lb)289 lb  BMI44.28 kg/m²        Method  =======     Transabdominal ultrasound examination. View: Suboptimal view: restricted by patient discomfort        Pregnancy  =========     [place] pregnancy. Number of fetuses: 1        Dating  ======     GA by prior [id number] w + 0 d  XIOMARA by prior assessment:3/5/2025  Ultrasound examination on:12/11/2024  GA by U/S based upon:AC, BPD, Femur, HC  GA by U/S28 w + 0 d  XIOMARA by U/S:3/5/2025  Method of dating:Restore dating from previous exam  Previous dating:based on stated XIOMARA, selected on 11/18/2024  Agreed XIOMARA of previous dating:3/5/2025  Assigned:based on stated XIOMARA, selected on 11/18/2024  Assigned GA28 w + 0 d  Assigned XIOMARA:3/5/2025  Pregnancy [id number] d        Fetal Biometry  ============     Standard  BPD70.5 mm  28w 2d        49%        Hadlock     OFD80.5 mm  26w 1d        6%        Keisha     .2 mm  27w 1d        6%        Hadlock     .6 mm  27w 4d        28%        Hadlock     Femur55.4 mm  29w 1d        70%        Hadlock     HC / AC1.08     EFW1,175 g          53%        Jonathan     EFW (lb)2 lb  EFW (oz)9 oz  EFW by:Hadlock (BPD-HC-AC-FL)  Head / Face / Neck  Cephalic index0.88          >99%        Nicolaides     Extremities / Bony Struc  FL / BPD0.79     FL / HC0.22     FL / AC0.24     Other Structures  ZTH719 bpm        General Evaluation  ================     Cardiac activity present.  bpm.  Fetal  movements present.  Presentation cephalic.  Placenta anterior, right lateral.  Umbilical cord 3 vessel cord; normal placental insertion.  Amniotic fluid Amount of AF: normal. MVP 6.8 cm. TEJAS 15.6 cm. Q1 3.7 cm, Q2 2.2 cm, Q3 2.9 cm, Q4 6.8 cm.        Fetal Anatomy  ============     Cranium:suboptimal  Head / Neck  Cranium:Due to patient discomfort  Lips:Normal  Profile:Normal  Nose:Normal  4-chamber view:Appears normal  RVOT view:Normal  LVOT view:Normal  Heart / Thorax  3-vessel view:Normal  3-vessel-trachea view:normal  Cord insertion:Normal  Stomach:Appears normal  Kidneys:Appears normal  Bladder:Appears normal  Gender:male  Wants to know gender:yes        Fetal Doppler  ===========     Arterial  Umbilical A PI1.16          77%        Kwaku     Umbilical A RI0.69          66%        Kwaku     Umbilical A PS-31.70 cm/s     Umbilical A ED-9.80 cm/s  Umbilical A TAmax-18.82 cm/s     Umbilical A MD-9.21 cm/s  Umbilical A S / D3.23          60%        Kwaku     Umbilical A  bpm        Impression  =========     Size consistent with dates.     No fetal anomalies were identified.     Amniotic fluid volume is normal.     Umbilical artery S/D ratio is normal.     Good fetal movement noted.        Recommendation  ===============     Follow-up as clinically indicated.        Coding  ======     Description:[id number] Follow Up Ultrasound           Assessment/Plan  IUP @ 28w0d with T2DM       1. T2DM  -Suboptimal control, limited monitoring, some patient non compliance, inconsistency  -Here for patterning.   -Lantus 20 qhs, Novolog 24/24/24. SSI prn. Will determine insulin needs and adjust accordingly.   -Pricila Schwartz RN has seen pt this am and synced Dexcom and did DE. PDC will follow FSBS weekly from dc.     2. Reg diet  -changed from CC bc pt reports she will likely be unable to follow CC diet at home    3. SCD    4. FWB reassuring  -normal growth  -NST TID      Lore Hankins MD  12/11/2024  08:45 EST

## 2024-12-11 NOTE — H&P
"[facility]  Obstetric History and Physical    Chief Complaint   Patient presents with    Decreased Fetal Movement    glucose control        HPI:      Patient is a 34 y.o. female  currently at 27w6d, who presents after being asked by her providers due to poorly controlled glucose.  She has been increasing her insulin levels on her own due to increased glucose levels despite her regular regimen.   She denies any other current issues including N/VF/C.   Denies chest pain and SOA.    Denies contractions, vaginal leaking and bleeding.   She did have decreased fetal movement over  the last day or so, but now feels movement with fetal monitors in place.       The following portions of the patients history were reviewed and updated as appropriate: current medications, allergies, past medical history, past surgical history, past family history, past social history and problem list .       Prenatal Information:   Maternal Prenatal Labs  Blood Type No results found for: \"ABO\"   Rh Status No results found for: \"RH\"   Antibody Screen No results found for: \"ABSCRN\"   Gonnorhea No results found for: \"GCCX\"   Chlamydia No results found for: \"CLAMYDCU\"   RPR No results found for: \"RPR\"   Syphilis Antibody No results found for: \"SYPHILIS\"   Rubella No results found for: \"RUBELLAIGGIN\"   Hepatitis B Surface Antigen No results found for: \"HEPBSAG\"   HIV-1 Antibody No results found for: \"LABHIV1\"   Hepatitis C Antibody No results found for: \"HEPCAB\"   Rapid Urin Drug Screen No results found for: \"AMPMETHU\", \"BARBITSCNUR\", \"LABBENZSCN\", \"LABMETHSCN\", \"LABOPIASCN\", \"THCURSCR\", \"COCAINEUR\", \"AMPHETSCREEN\", \"PROPOXSCN\", \"BUPRENORSCNU\", \"METAMPSCNUR\", \"OXYCODONESCN\", \"TRICYCLICSCN\"   Group B Strep Culture No results found for: \"GBSANTIGEN\"           External Prenatal Results       Pregnancy Outside Results - Transcribed From Office Records - See Scanned Records For Details       Test Value Date Time    ABO  A  24 1440    Rh  " Positive  08/05/24 1440    Antibody Screen  Negative  08/05/24 1440    Varicella IgG  393 index 08/05/24 1440    Rubella  1.18 index 08/05/24 1440    Hgb  11.8 g/dL 12/10/24 1724       13.2 g/dL 08/11/24 1606       14.2 g/dL 08/05/24 1440    Hct  34.8 % 12/10/24 1724       39.4 % 08/11/24 1606       41.5 % 08/05/24 1440    HgB A1c   6.20 % 12/10/24 1724       6.0 % 08/19/24 1457       5.70 % 08/05/24 1440       6.1 % 07/16/24 1034    1h GTT       3h GTT Fasting       3h GTT 1 hour       3h GTT 2 hour       3h GTT 3 hour        Gonorrhea (discrete)  Negative  08/05/24 1440    Chlamydia (discrete)  Negative  08/05/24 1440    RPR       Syphils cascade: TP-Ab (FTA)  Non-Reactive  08/05/24 1440    TP-Ab  Non-Reactive  08/05/24 1440    TP-Ab (EIA)       TPPA       HBsAg  Non-Reactive  08/05/24 1440    Herpes Simplex Virus PCR       Herpes Simplex VIrus Culture       HIV  Non-Reactive  08/05/24 1440    Hep C RNA Quant PCR       Hep C Antibody       AFP       NIPT       Cystic Fibrosis (Khoi)       Cystic Fibroisis        Spinal Muscular atrophy       Fragile X       Group B Strep       GBS Susceptibility to Clindamycin       GBS Susceptibility to Erythromycin       Fetal Fibronectin       Genetic Testing, Maternal Blood                 Drug Screening       Test Value Date Time    Urine Drug Screen       Amphetamine Screen  Negative  08/05/24 1440    Barbiturate Screen  Negative  08/05/24 1440    Benzodiazepine Screen  Negative  08/05/24 1440    Methadone Screen  Negative  08/05/24 1440    Phencyclidine Screen  Negative  08/05/24 1440    Opiates Screen  Negative  08/05/24 1440    THC Screen  Negative  08/05/24 1440    Cocaine Screen       Propoxyphene Screen       Buprenorphine Screen  Negative  08/05/24 1440    Methamphetamine Screen       Oxycodone Screen  Negative  08/05/24 1440    Tricyclic Antidepressants Screen  Negative  08/05/24 1440              Legend    ^: Historical                              Past OB  History:     OB History    Para Term  AB Living   11 1 0 1 9 1   SAB IAB Ectopic Molar Multiple Live Births   9 0 0 0 0 1      # Outcome Date GA Lbr Kevan/2nd Weight Sex Type Anes PTL Lv   11 Current            10 2016 6w0d    SAB      9 SAB 2015 12w0d    SAB      8 2014 6w0d    SAB      7 SAB 2013 6w0d    SAB      6 SAB 2013 6w0d    SAB      5 2011 8w0d    SAB      4 2011 6w0d    SAB      3 2010 6w0d    SAB      2 2009 6w0d    SAB      1  08 34w0d  227 g (8 oz) M Vag-Vacuum EPI  FERNANDO      Birth Comments: preeclampsia; GDM      Obstetric Comments   FOB #1 Pregnancy #1, 2, 3   FOB #2 Pregnancy #4 SAB, D&C, #5-10   FOB #3 Pregnancy #1  current   9 miscarriages       Past Medical History: Past Medical History:   Diagnosis Date    Fibromyalgia     Gestational diabetes     Hypertension     Rheumatoid arthritis     Type 2 diabetes mellitus       Past Surgical History Past Surgical History:   Procedure Laterality Date    DILATATION AND CURETTAGE      EAR RECONSTRUCTION      LAPAROSCOPIC CHOLECYSTECTOMY      MOUTH SURGERY  2004    NOSE SURGERY  2004    TONSILLECTOMY AND ADENOIDECTOMY        Family History: Family History   Problem Relation Age of Onset    Mental illness Mother     Hypertension Father     Diabetes Father     Fibromyalgia Father     Rheum arthritis Father     Fibromyalgia Sister     Rheum arthritis Sister     Crohn's disease Sister     Lupus Sister     Stroke Other     Heart attack Other     Diabetes Other     Liver disease Other       Social History:  reports that she has been smoking cigarettes. She has never used smokeless tobacco.   reports no history of alcohol use.   reports no history of drug use.       Objective     Vital Signs Range for the last 24 hours  Temperature: Temp:  [98.5 °F (36.9 °C)] 98.5 °F (36.9 °C)   Temp Source: Temp src: Oral   BP: BP: (132)/(72) 132/72   Pulse: Heart Rate:  [110] 110   Respirations: Resp:  [16] 16   SPO2:      O2 Amount (l/min):     O2 Devices     Weight: Weight:  [131 kg (288 lb 12.8 oz)] 131 kg (288 lb 12.8 oz)     Physical Examination: General appearance - alert, well appearing, and in no distress and oriented to person, place, and time  Chest -Breathing is unlaboured   Heart - Tachycardia rate   Abdomen - soft, nontender, nondistended,  gravid uterus   Extremities - pedal edema +2      Fetal Heart Rate Assessment   Method: Fetal HR Assessment Method: external   Beats/min: Fetal HR (beats/min): 135   Baseline: Fetal HR Baseline: normal range   Varibility: Fetal HR Variability: moderate (amplitude range 6 to 25 bpm)   Accels: Fetal HR Accelerations: greater than/equal to 15 bpm, lasting at least 15 seconds   Decels: Fetal HR Decelerations: absent   Tracing Category:       Uterine Assessment   Method: Method: external tocotransducer   Frequency (min):     Ctx Count in 10 min:     Duration:     Intensity: Contraction Intensity: no contractions   Intensity by IUPC:     Resting Tone:     Resting Tone by IUPC:           Laboratory Results:   Lab Results   Component Value Date     12/10/2024    HGB 11.8 (L) 12/10/2024    HCT 34.8 12/10/2024    WBC 15.71 (H) 12/10/2024      Lab Results   Component Value Date     12/10/2024    K 3.9 12/10/2024     12/10/2024    CO2 21.0 (L) 12/10/2024    BUN 8 12/10/2024    CREATININE 0.54 (L) 12/10/2024    GLUCOSE 184 (H) 12/10/2024    ALBUMIN 3.2 (L) 12/10/2024    CALCIUM 8.9 12/10/2024    AST 8 12/10/2024    ALT 7 12/10/2024    BILITOT <0.2 12/10/2024      HGB A1C   6.20     Lab Results   Component Value Date    SQUAMEPIUA 7-12 (A) 12/10/2024    SPECGRAVUR 1.032 (H) 12/10/2024    KETONESU Trace (A) 12/10/2024    BLOODU Negative 12/10/2024    LEUKOCYTESUR Negative 12/10/2024    NITRITEU Negative 12/10/2024    RBCUA None Seen 12/10/2024    WBCUA 0-2 12/10/2024    BACTERIA 3+ (A) 12/10/2024            Assessment:  1. Intrauterine pregnancy at 27w6d weeks gestation with  reactive fetal status  2. IDDM - poorly controlled  3. CHTN  4. Poor OB history  5. Decreased fetal movement  6. AMA    Plan:  1. Admit to APU  2. Speaking with , will start with her usual insulin regime of 18 units long acting nightly and 20 units of short acting with meals.   Will do sliding scale for coverage and adjust her regular regimen depending on the correction doing requirements.    3.  Continue current HTN meds  4.  TID fetal monitoring for 1 hour with NST      Ambrocio Dotson MD  12/10/2024  19:59 EST

## 2024-12-11 NOTE — PAYOR COMM NOTE
"Henna CampuzanoAbiolachaz (34 y.o. Female)     From: Eryn Greenberg LPN, Utilization Review  Phone #[phone]  Fax #[phone]      Medical Inpatient Admission.          Date of Birth   1990    Social Security Number       Address   [address] [place] [address]  [place] [address]    Home Phone   [phone]    MRN   [id number]       Confucianism   None    Marital Status   Significant Other                            Admission Date   12/10/24    Admission Type   Elective    Admitting Provider   Edna Muller DO    Attending Provider   Lore Hankins MD    Department, Room/Bed   [facility] ANTEPARTUM, N326/1       Discharge Date       Discharge Disposition       Discharge Destination                                 Attending Provider: Lore Hankins MD    Allergies: Sulfamethoxazole-trimethoprim    Isolation: None   Infection: None   Code Status: CPR    Ht: 172.7 cm (68\")   Wt: 131 kg (288 lb 12.8 oz)    Admission Cmt: None   Principal Problem: Gestational diabetes [O24.419]                   Active Insurance as of 12/10/2024       Primary Coverage       Payor Plan Insurance Group Employer/Plan Group    [employer] BY [id number] BY SAURAV [id number]       Payor Plan Address Payor Plan Phone Number Payor Plan Fax Number Effective Dates    [address] [address]   1/1/2021 - None Entered    [facility] [id number]         Subscriber Name Subscriber Birth Date Member ID       HENNA CAMPUZANO 1990 [id number]                     Emergency Contacts        (Rel.) Home Phone Work Phone Mobile Phone    DEONDRE RODRIGUEZ (Spouse) [phone] -- [phone]    HENNA JOHNSON (Mother) [phone] -- [phone]              Insurance Information                  [employer] BY [employer] BY SAURAV Phone: --    Subscriber: Henna Campuzano Subscriber#: [id number]    Group#: [id number] Precert#: --    Authorization#: -- " "Effective Date: --             History & Physical        Ambrocio Dotson MD at 12/10/24 1959          [facility]  Obstetric History and Physical    Chief Complaint   Patient presents with    Decreased Fetal Movement    glucose control        HPI:      Patient is a 34 y.o. female  currently at 27w6d, who presents after being asked by her providers due to poorly controlled glucose.  She has been increasing her insulin levels on her own due to increased glucose levels despite her regular regimen.   She denies any other current issues including N/VF/C.   Denies chest pain and SOA.    Denies contractions, vaginal leaking and bleeding.   She did have decreased fetal movement over  the last day or so, but now feels movement with fetal monitors in place.       The following portions of the patients history were reviewed and updated as appropriate: current medications, allergies, past medical history, past surgical history, past family history, past social history and problem list .       Prenatal Information:   Maternal Prenatal Labs  Blood Type No results found for: \"ABO\"   Rh Status No results found for: \"RH\"   Antibody Screen No results found for: \"ABSCRN\"   Gonnorhea No results found for: \"GCCX\"   Chlamydia No results found for: \"CLAMYDCU\"   RPR No results found for: \"RPR\"   Syphilis Antibody No results found for: \"SYPHILIS\"   Rubella No results found for: \"RUBELLAIGGIN\"   Hepatitis B Surface Antigen No results found for: \"HEPBSAG\"   HIV-1 Antibody No results found for: \"LABHIV1\"   Hepatitis C Antibody No results found for: \"HEPCAB\"   Rapid Urin Drug Screen No results found for: \"AMPMETHU\", \"BARBITSCNUR\", \"LABBENZSCN\", \"LABMETHSCN\", \"LABOPIASCN\", \"THCURSCR\", \"COCAINEUR\", \"AMPHETSCREEN\", \"PROPOXSCN\", \"BUPRENORSCNU\", \"METAMPSCNUR\", \"OXYCODONESCN\", \"TRICYCLICSCN\"   Group B Strep Culture No results found for: \"GBSANTIGEN\"           External Prenatal Results       Pregnancy Outside Results - Transcribed From " Office Records - See Scanned Records For Details       Test Value Date Time    ABO  A  08/05/24 1440    Rh  Positive  08/05/24 1440    Antibody Screen  Negative  08/05/24 1440    Varicella IgG  393 index 08/05/24 1440    Rubella  1.18 index 08/05/24 1440    Hgb  11.8 g/dL 12/10/24 1724       13.2 g/dL 08/11/24 1606       14.2 g/dL 08/05/24 1440    Hct  34.8 % 12/10/24 1724       39.4 % 08/11/24 1606       41.5 % 08/05/24 1440    HgB A1c   6.20 % 12/10/24 1724       6.0 % 08/19/24 1457       5.70 % 08/05/24 1440       6.1 % 07/16/24 1034    1h GTT       3h GTT Fasting       3h GTT 1 hour       3h GTT 2 hour       3h GTT 3 hour        Gonorrhea (discrete)  Negative  08/05/24 1440    Chlamydia (discrete)  Negative  08/05/24 1440    RPR       Syphils cascade: TP-Ab (FTA)  Non-Reactive  08/05/24 1440    TP-Ab  Non-Reactive  08/05/24 1440    TP-Ab (EIA)       TPPA       HBsAg  Non-Reactive  08/05/24 1440    Herpes Simplex Virus PCR       Herpes Simplex VIrus Culture       HIV  Non-Reactive  08/05/24 1440    Hep C RNA Quant PCR       Hep C Antibody       AFP       NIPT       Cystic Fibrosis (Khoi)       Cystic Fibroisis        Spinal Muscular atrophy       Fragile X       Group B Strep       GBS Susceptibility to Clindamycin       GBS Susceptibility to Erythromycin       Fetal Fibronectin       Genetic Testing, Maternal Blood                 Drug Screening       Test Value Date Time    Urine Drug Screen       Amphetamine Screen  Negative  08/05/24 1440    Barbiturate Screen  Negative  08/05/24 1440    Benzodiazepine Screen  Negative  08/05/24 1440    Methadone Screen  Negative  08/05/24 1440    Phencyclidine Screen  Negative  08/05/24 1440    Opiates Screen  Negative  08/05/24 1440    THC Screen  Negative  08/05/24 1440    Cocaine Screen       Propoxyphene Screen       Buprenorphine Screen  Negative  08/05/24 1440    Methamphetamine Screen       Oxycodone Screen  Negative  08/05/24 1440    Tricyclic Antidepressants  Screen  Negative  24 1440              Legend    ^: Historical                              Past OB History:     OB History    Para Term  AB Living   11 1 0 1 9 1   SAB IAB Ectopic Molar Multiple Live Births   9 0 0 0 0 1      # Outcome Date GA Lbr Kevan/2nd Weight Sex Type Anes PTL Lv   11 Current            10 2016 6w0d    SAB      9 SAB 2015 12w0d    SAB      8 SAB  6w0d    SAB      7 SAB 2013 6w0d    SAB      6 SAB 2013 6w0d    SAB      5 SAB 2011 8w0d    SAB      4 SAB 2011 6w0d    SAB      3 SAB  6w0d    SAB      2 SAB  6w0d    SAB      1  08 34w0d  227 g (8 oz) M Vag-Vacuum EPI  FERNANDO      Birth Comments: preeclampsia; GDM      Obstetric Comments   FOB #1 Pregnancy #1, 2, 3   FOB #2 Pregnancy #4 SAB, D&C, #5-10   FOB #3 Pregnancy #1  current   9 miscarriages       Past Medical History: Past Medical History:   Diagnosis Date    Fibromyalgia     Gestational diabetes     Hypertension     Rheumatoid arthritis     Type 2 diabetes mellitus       Past Surgical History Past Surgical History:   Procedure Laterality Date    DILATATION AND CURETTAGE      EAR RECONSTRUCTION      LAPAROSCOPIC CHOLECYSTECTOMY      MOUTH SURGERY  2004    NOSE SURGERY  2004    TONSILLECTOMY AND ADENOIDECTOMY        Family History: Family History   Problem Relation Age of Onset    Mental illness Mother     Hypertension Father     Diabetes Father     Fibromyalgia Father     Rheum arthritis Father     Fibromyalgia Sister     Rheum arthritis Sister     Crohn's disease Sister     Lupus Sister     Stroke Other     Heart attack Other     Diabetes Other     Liver disease Other       Social History:  reports that she has been smoking cigarettes. She has never used smokeless tobacco.   reports no history of alcohol use.   reports no history of drug use.       Objective     Vital Signs Range for the last 24 hours  Temperature: Temp:  [98.5 °F (36.9 °C)] 98.5 °F (36.9 °C)   Temp Source: Temp  src: Oral   BP: BP: (132)/(72) 132/72   Pulse: Heart Rate:  [110] 110   Respirations: Resp:  [16] 16   SPO2:     O2 Amount (l/min):     O2 Devices     Weight: Weight:  [131 kg (288 lb 12.8 oz)] 131 kg (288 lb 12.8 oz)     Physical Examination: General appearance - alert, well appearing, and in no distress and oriented to person, place, and time  Chest -Breathing is unlaboured   Heart - Tachycardia rate   Abdomen - soft, nontender, nondistended,  gravid uterus   Extremities - pedal edema +2      Fetal Heart Rate Assessment   Method: Fetal HR Assessment Method: external   Beats/min: Fetal HR (beats/min): 135   Baseline: Fetal HR Baseline: normal range   Varibility: Fetal HR Variability: moderate (amplitude range 6 to 25 bpm)   Accels: Fetal HR Accelerations: greater than/equal to 15 bpm, lasting at least 15 seconds   Decels: Fetal HR Decelerations: absent   Tracing Category:       Uterine Assessment   Method: Method: external tocotransducer   Frequency (min):     Ctx Count in 10 min:     Duration:     Intensity: Contraction Intensity: no contractions   Intensity by IUPC:     Resting Tone:     Resting Tone by IUPC:           Laboratory Results:   Lab Results   Component Value Date     12/10/2024    HGB 11.8 (L) 12/10/2024    HCT 34.8 12/10/2024    WBC 15.71 (H) 12/10/2024      Lab Results   Component Value Date     12/10/2024    K 3.9 12/10/2024     12/10/2024    CO2 21.0 (L) 12/10/2024    BUN 8 12/10/2024    CREATININE 0.54 (L) 12/10/2024    GLUCOSE 184 (H) 12/10/2024    ALBUMIN 3.2 (L) 12/10/2024    CALCIUM 8.9 12/10/2024    AST 8 12/10/2024    ALT 7 12/10/2024    BILITOT <0.2 12/10/2024      HGB A1C   6.20     Lab Results   Component Value Date    SQUAMEPIUA 7-12 (A) 12/10/2024    SPECGRAVUR 1.032 (H) 12/10/2024    KETONESU Trace (A) 12/10/2024    BLOODU Negative 12/10/2024    LEUKOCYTESUR Negative 12/10/2024    NITRITEU Negative 12/10/2024    RBCUA None Seen 12/10/2024    WBCUA 0-2 12/10/2024     BACTERIA 3+ (A) 12/10/2024            Assessment:  1. Intrauterine pregnancy at 27w6d weeks gestation with reactive fetal status  2. IDDM - poorly controlled  3. CHTN  4. Poor OB history  5. Decreased fetal movement  6. AMA    Plan:  1. Admit to APU  2. Speaking with , will start with her usual insulin regime of 18 units long acting nightly and 20 units of short acting with meals.   Will do sliding scale for coverage and adjust her regular regimen depending on the correction doing requirements.    3.  Continue current HTN meds  4.  TID fetal monitoring for 1 hour with NST      Ambrocio Dotson MD  12/10/2024  19:59 EST    Electronically signed by Ambrocio Dotson MD at 12/10/24 2042       Vital Signs (last 2 days)       Date/Time Temp Temp src Pulse Resp BP Patient Position    12/11/24 0753 98.1 (36.7) Oral 92 16 115/56 Lying    12/11/24 0413 -- -- 90 -- 122/56 --    12/10/24 1929 98.4 (36.9) Oral 95 16 118/58 Lying    12/10/24 1705 98.5 (36.9) Oral 110 16 132/72 Lying    12/10/24 1700 -- -- -- -- -- --    Comment rows:    OBSERV: pt has cgm on left upper arm at 12/10/24 1700          Facility-Administered Medications as of 12/11/2024   Medication Dose Route Frequency Provider Last Rate Last Admin    acetaminophen (TYLENOL) tablet 1,000 mg  1,000 mg Oral Q6H PRN Lore Hankins MD   1,000 mg at 12/11/24 0848    dextrose (D50W) (25 g/50 mL) IV injection 25 g  25 g Intravenous Q15 Min PRN Lore Hankins MD        dextrose (GLUTOSE) oral gel 15 g  15 g Oral Q15 Min PRN Lore Hankins MD        glucagon (GLUCAGEN) injection 1 mg  1 mg Intramuscular Q15 Min PRN Lore Hankins MD        insulin glargine (LANTUS, SEMGLEE) injection 20 Units  20 Units Subcutaneous Nightly Lore Hankins MD        Insulin Lispro (humaLOG) injection 2-9 Units  2-9 Units Subcutaneous 4x Daily AC & at Bedtime Lore Hankins MD   2 Units at 12/10/24 2230    Insulin Lispro (humaLOG) injection 24 Units  24 Units  Subcutaneous TID With Meals Lore Hankins MD        prenatal vitamin tablet 1 tablet  1 tablet Oral Daily Lore Hankins MD   1 tablet at 12/10/24 2055     Lab Results (last 48 hours)       Procedure Component Value Units Date/Time    POC Glucose Once [[id number]]  (Normal) Collected: 12/11/24 1037    Specimen: Blood Updated: 12/11/24 1039     Glucose 113 mg/dL     POC Glucose Once [[id number]]  (Normal) Collected: 12/11/24 0751    Specimen: Blood Updated: 12/11/24 0757     Glucose 118 mg/dL     POC Glucose Once [[id number]]  (Abnormal) Collected: 12/10/24 2225    Specimen: Blood Updated: 12/10/24 2227     Glucose 157 mg/dL     POC Glucose Once [[id number]]  (Abnormal) Collected: 12/10/24 1934    Specimen: Blood Updated: 12/10/24 1936     Glucose 169 mg/dL     Urinalysis, Microscopic Only - Urine, Clean Catch [[id number]]  (Abnormal) Collected: 12/10/24 1730    Specimen: Urine, Clean Catch Updated: 12/10/24 1838     RBC, UA None Seen /HPF      WBC, UA 0-2 /HPF      Bacteria, UA 3+ /HPF      Squamous Epithelial Cells, UA 7-12 /HPF      Hyaline Casts, UA 0-6 /LPF      Methodology Manual Light Microscopy    Hemoglobin A1c [[id number]]  (Abnormal) Collected: 12/10/24 1724    Specimen: Blood Updated: 12/10/24 1818     Hemoglobin A1C 6.20 %     Narrative:      Hemoglobin A1C Ranges:    Increased Risk for Diabetes  5.7% to 6.4%  Diabetes                     >= 6.5%  Diabetic Goal                < 7.0%    Comprehensive Metabolic Panel [[id number]]  (Abnormal) Collected: 12/10/24 1724    Specimen: Blood Updated: 12/10/24 1816     Glucose 184 mg/dL      BUN 8 mg/dL      Creatinine 0.54 mg/dL      Sodium 137 mmol/L      Potassium 3.9 mmol/L      Chloride 102 mmol/L      CO2 21.0 mmol/L      Calcium 8.9 mg/dL      Total Protein 6.1 g/dL      Albumin 3.2 g/dL      ALT (SGPT) 7 U/L      AST (SGOT) 8 U/L      Alkaline Phosphatase 90 U/L      Total Bilirubin <0.2 mg/dL      Globulin 2.9 gm/dL      Comment: Calculated Result         A/G Ratio 1.1 g/dL      BUN/Creatinine Ratio 14.8     Anion Gap 14.0 mmol/L      eGFR 122.5 mL/min/1.73     Narrative:      GFR Categories in Chronic Kidney Disease (CKD)      GFR Category          GFR (mL/min/1.73)    Interpretation  G1                     90 or greater         Normal or high (1)  G2                      60-89                Mild decrease (1)  G3a                   45-59                Mild to moderate decrease  G3b                   30-44                Moderate to severe decrease  G4                    15-29                Severe decrease  G5                    14 or less           Kidney failure          (1)In the absence of evidence of kidney disease, neither GFR category G1 or G2 fulfill the criteria for CKD.    eGFR calculation  CKD-EPI creatinine equation, which does not include race as a factor    Urinalysis With Microscopic If Indicated (No Culture) - Urine, Clean Catch [[id number]]  (Abnormal) Collected: 12/10/24 1730    Specimen: Urine, Clean Catch Updated: 12/10/24 1800     Color, UA Yellow     Appearance, UA Cloudy     pH, UA 6.0     Specific Gravity, UA 1.032     Glucose, UA >=1000 mg/dL (3+)     Ketones, UA Trace     Bilirubin, UA Negative     Blood, UA Negative     Protein, UA Trace     Leuk Esterase, UA Negative     Nitrite, UA Negative     Urobilinogen, UA 1.0 E.U./dL    CBC (No Diff) [[id number]]  (Abnormal) Collected: 12/10/24 1724    Specimen: Blood Updated: 12/10/24 1755     WBC 15.71 10*3/mm3      RBC 3.80 10*6/mm3      Hemoglobin 11.8 g/dL      Hematocrit 34.8 %      MCV 91.6 fL      MCH 31.1 pg      MCHC 33.9 g/dL      RDW 13.1 %      RDW-SD 43.6 fl      MPV 11.3 fL      Platelets 203 10*3/mm3           Imaging Results (Last 48 Hours)       Procedure Component Value Units Date/Time    [facility] [[id number]] Collected: 24     Updated: 24    Narrative:      PAT NAME: SAQIB ARMIJO  MED REC#: [id number]  BIRTH DA: [id number]  DIETER  GEND: F  ACCOUNT#: [id number]  PAT TYPE: O  EXAM FLORENTIN: [id number]  REF PHYS LILIANA CEBALLOS  ACCESSION [id number]      Comparison Studies  =================    The findings of this study are compared to the prior ultrasound study dated 11/18/24      Patient Status  ============    Inpatient      Indication  ========    Type 2 DM, (poor control) Decreased fetal movement, UTI      Maternal Assessment  ==================    Height 172 cm  Height (ft) 5 ft  Height (in) 8 in  Weight 131 kg  Weight (lb) 289 lb  BMI 44.28 kg/m²      Method  =======    Transabdominal ultrasound examination. View: Suboptimal view: restricted by patient discomfort      Pregnancy  =========    [place] pregnancy. Number of fetuses: 1      Dating  ======    GA by prior assessment 28 w + 0 d  XIOMARA by prior assessment: 3/5/2025  Ultrasound examination on: 12/11/2024  GA by U/S based upon: AC, BPD, Femur, HC  GA by U/S 28 w + 0 d  XIOMARA by U/S: 3/5/2025  Method of dating: Restore dating from previous exam  Previous dating: based on stated XIOMARA, selected on 11/18/2024  Agreed XIOMARA of previous dating: 3/5/2025  Assigned: based on stated XIOMARA, selected on 11/18/2024  Assigned GA 28 w + 0 d  Assigned XIOMARA: 3/5/2025  Pregnancy length 280 d      Fetal Biometry  ============    Standard  BPD 70.5 mm  28w 2d        49%        Hadlock    OFD 80.5 mm  26w 1d        6%        Keisha    .2 mm  27w 1d        6%        Hadlock    .6 mm  27w 4d        28%        Hadlock    Femur 55.4 mm  29w 1d        70%        Hadlock    HC / AC 1.08    EFW 1,175 g          53%        Jonathan    EFW (lb) 2 lb  EFW (oz) 9 oz  EFW by: Hadlock (BPD-HC-AC-FL)  Head / Face / Neck  Cephalic index 0.88          >99%        Nicolaides    Extremities / Bony Struc  FL / BPD 0.79    FL / HC 0.22    FL / AC 0.24    Other Structures   bpm      General Evaluation  ================    Cardiac activity present.  bpm.  Fetal movements present.  Presentation  cephalic.  Placenta anterior, right lateral.  Umbilical cord 3 vessel cord; normal placental insertion.  Amniotic fluid Amount of AF: normal. MVP 6.8 cm. TEJAS 15.6 cm. Q1 3.7 cm, Q2 2.2 cm, Q3 2.9 cm, Q4 6.8 cm.      Fetal Anatomy  ============    Cranium: suboptimal  Head / Neck  Cranium: Due to patient discomfort  Lips: Normal  Profile: Normal  Nose: Normal  4-chamber view: Appears normal  RVOT view: Normal  LVOT view: Normal  Heart / Thorax  3-vessel view: Normal  3-vessel-trachea view: normal  Cord insertion: Normal  Stomach: Appears normal  Kidneys: Appears normal  Bladder: Appears normal  Gender: male  Wants to know gender: yes      Fetal Doppler  ===========    Arterial  Umbilical A PI 1.16          77%        Kwaku    Umbilical A RI 0.69          66%        Kwaku    Umbilical A PS -31.70 cm/s    Umbilical A ED -9.80 cm/s  Umbilical A TAmax -18.82 cm/s    Umbilical A MD -9.21 cm/s  Umbilical A S / D 3.23          60%        Kwaku    Umbilical A  bpm      Impression  =========    Size consistent with dates.    No fetal anomalies were identified.    Amniotic fluid volume is normal.    Umbilical artery S/D ratio is normal.    Good fetal movement noted.      Recommendation  ===============    Follow-up as clinically indicated.      Coding  ======    Description: 37811-11 Follow Up Ultrasound        Sonographer: Bambi Joy [facility]  Physician: Doug Milligan, MD, FACOG    Electronically signed by: Doug Milligan, MD, FACOG at: 2024/12/11 07:47          Orders (last 48 hrs)        Start     Ordered    12/11/24 2100  insulin glargine (LANTUS, SEMGLEE) injection 20 Units  Nightly         12/11/24 0902    12/11/24 1200  Insulin Lispro (humaLOG) injection 24 Units  3 Times Daily With Meals         12/11/24 0902    12/11/24 1040  POC Glucose Once  PROCEDURE ONCE        Comments: Complete no more than 45 minutes prior to patient eating      12/11/24 1037    12/11/24 0900  NIFEdipine XL (PROCARDIA XL) 24 hr tablet  30 mg  Every 24 Hours Scheduled,   Status:  Discontinued         12/10/24 18524 0854  Diet: Regular/House; Fluid Consistency: Thin (IDDSI 0)  Diet Effective Now         24 0853    24 0854  Diabetes Educator Consult  Once,   Status:  Canceled        Provider:  (Not yet assigned)    24 0853    24 0845  Admit To Obstetrics Inpatient  Once         24 0844    24 0845  Code Status and Medical Interventions: CPR (Attempt to Resuscitate); Full Support  Continuous         24 0844    24 0822  acetaminophen (TYLENOL) tablet 1,000 mg  Every 6 Hours PRN         24 0822    24 0800  Fetal Monitoring  3 Times Daily        Comments: TID 1 hr monitoring    12/10/24 1833    12/11/24 0758  POC Glucose Once  PROCEDURE ONCE        Comments: Complete no more than 45 minutes prior to patient eating      24 0751    24 0700  [facility]  1 Time Imaging         12/10/24 1831    12/10/24 222  POC Glucose Once  PROCEDURE ONCE        Comments: Complete no more than 45 minutes prior to patient eating      12/10/24 2225    12/10/24 2100  Insulin Lispro (humaLOG) injection 2-9 Units  4 Times Daily Before Meals & Nightly         12/10/24 1818    12/10/24 2100  insulin glargine (LANTUS, SEMGLEE) injection 18 Units  Nightly,   Status:  Discontinued         12/10/24 1820    12/10/24 2100  prenatal vitamin tablet 1 tablet  Daily         12/10/24 1857    12/10/24 2100  aspirin chewable tablet 81 mg  Daily,   Status:  Discontinued         12/10/24 1857    12/10/24 193  POC Glucose Once  PROCEDURE ONCE        Comments: Complete no more than 45 minutes prior to patient eating      12/10/24 1934    12/10/24 1921  Update MD for blood sugars over 200  Nursing Communication  Once        Comments: Update MD for blood sugars over 200    12/10/24 1921    12/10/24 190  POC Glucose 4x Daily Fasting & PC  4 Times Daily Fasting & After Meals      Comments:  Complete no more than 45 minutes prior to patient eating      12/10/24 1818    12/10/24 1830  Insulin Lispro (humaLOG) injection 20 Units  3 Times Daily With Meals,   Status:  Discontinued         12/10/24 1820    12/10/24 1818  Diet: Diabetic; Consistent Carbohydrate; Texture: Regular (IDDSI 7); Fluid Consistency: Thin (IDDSI 0)  Diet Effective Now,   Status:  Canceled         12/10/24 1817    12/10/24 1817  dextrose (GLUTOSE) oral gel 15 g  Every 15 Minutes PRN         12/10/24 1818    12/10/24 1817  dextrose (D50W) (25 g/50 mL) IV injection 25 g  Every 15 Minutes PRN         12/10/24 1818    12/10/24 1817  glucagon (GLUCAGEN) injection 1 mg  Every 15 Minutes PRN         12/10/24 1818    12/10/24 1748  Urinalysis, Microscopic Only - Urine, Clean Catch  Once         12/10/24 1747    12/10/24 1716  Maintain IV Access  Continuous         12/10/24 1715    12/10/24 1715  Hemoglobin A1c  STAT         12/10/24 1715    12/10/24 1715  Urinalysis With Microscopic If Indicated (No Culture) - Urine, Clean Catch  STAT         12/10/24 1715    12/10/24 1714  CBC (No Diff)  STAT         12/10/24 1715    12/10/24 1714  Comprehensive Metabolic Panel  STAT         12/10/24 1715    Unscheduled  Follow Hypoglycemia Standing Orders For Blood Glucose <70 & Notify Provider of Treatment  As Needed      Comments: Follow Hypoglycemia Orders As Outlined in Process Instructions (Open Order Report to View Full Instructions)  Notify Provider Any Time Hypoglycemia Treatment is Administered    12/10/24 1818                     Physician Progress Notes (last 48 hours)        Lore Hankins MD at 12/11/24 0845          [facility]  Antepartum Progress Note    Chief Complaint: HD 2    Subjective   Feels well overall. Concerned that diet here is not the same as what she eats at home. No other complaints. + FM no VB      Objective     Vital Signs Range for the last 24 hours  Temp:  [98.1 °F (36.7 °C)-98.5 °F (36.9 °C)] 98.1 °F (36.7 °C)   BP:  (115-132)/(56-72) 115/56   Heart Rate:  [] 92   Resp:  [16] 16                 Fetal Heart Rate Assessment   Method: Fetal HR Assessment Method: external   Beats/min: Fetal HR (beats/min): 150   Baseline: Fetal HR Baseline: normal range   Variability: Fetal HR Variability: moderate (amplitude range 6 to 25 bpm)   Accels: Fetal HR Accelerations: greater than/equal to 15 bpm, lasting at least 15 seconds   Decels: Fetal HR Decelerations: absent   Tracing Category:       Uterine Assessment   Method: Method: external tocotransducer   Frequency (min):     Ctx Count in 10 min:     Duration:     Intensity: Contraction Intensity: no contractions   Intensity by IUPC:     Resting Tone:     Resting Tone by IUPC:              Intake/Output last 24 hours:    No intake or output data in the 24 hours ending 24 0845    Intake/Output this shift:    No intake/output data recorded.    Physical Exam:  General: Patient is comfortable and well appearing   Heart CVS exam: normal rate and regular rhythm.   Lungs Chest: clear to auscultation, no wheezes, rales or rhonchi, symmetric air entry.     Abdomen S/gravid/NT   Extremities No edema       Laboratory Results  CBC:      Lab 12/10/24  1724   WBC 15.71*   HEMOGLOBIN 11.8*   HEMATOCRIT 34.8   PLATELETS 203   MCV 91.6     Blood Sugar in Office          2024    15:02 12/10/2024    19:34 12/10/2024    22:25 2024    07:51   Blood Sugar in Office   Glucose 151  169  157  118         Ultrasound Results: [facility] [[id number]] (Order [id number])  Order  Status: Final result     Study Result    PAT NAME: SAQIB ARMIJO  [facility] REC#: [id number]  BIRTH DA: [id number]  PAT GEND: F  ACCOUNT#: [id number]  PAT TYPE: O  EXAM FLORENTIN: [id number]  REF PHYS LILIANA CEBALLOS  ACCESSION [id number]        Comparison Studies  =================     The findings of this study are compared to the prior ultrasound study dated 24        Patient Status  ============      Inpatient        Indication  ========     Type 2 DM, (poor control) Decreased fetal movement, UTI        Maternal Assessment  ==================     [id number] cm  Height (ft)5 ft  Height (in)8 in  Fdvbxe995 kg  Weight (lb)289 lb  BMI44.28 kg/m²        Method  =======     Transabdominal ultrasound examination. View: Suboptimal view: restricted by patient discomfort        Pregnancy  =========     [place] pregnancy. Number of fetuses: 1        Dating  ======     GA by prior [id number] w + 0 d  XIOMARA by prior assessment:3/5/2025  Ultrasound examination on:12/11/2024  GA by U/S based upon:AC, BPD, Femur, HC  GA by U/S28 w + 0 d  XIOMARA by U/S:3/5/2025  Method of dating:Restore dating from previous exam  Previous dating:based on stated XIOMARA, selected on 11/18/2024  Agreed XIOMARA of previous dating:3/5/2025  Assigned:based on stated XIOMARA, selected on 11/18/2024  Assigned GA28 w + 0 d  Assigned XIOMARA:3/5/2025  Pregnancy [id number] d        Fetal Biometry  ============     Standard  BPD70.5 mm  28w 2d        49%        Hadlock     OFD80.5 mm  26w 1d        6%        Keisha     .2 mm  27w 1d        6%        Hadlock     .6 mm  27w 4d        28%        Hadlock     Femur55.4 mm  29w 1d        70%        Hadlock     HC / AC1.08     EFW1,175 g          53%        Jonathan     EFW (lb)2 lb  EFW (oz)9 oz  EFW by:Hadlock (BPD-HC-AC-FL)  Head / Face / Neck  Cephalic index0.88          >99%        Nicolaides     Extremities / Bony Struc  FL / BPD0.79     FL / HC0.22     FL / AC0.24     Other Structures  EBK653 bpm        General Evaluation  ================     Cardiac activity present.  bpm.  Fetal movements present.  Presentation cephalic.  Placenta anterior, right lateral.  Umbilical cord 3 vessel cord; normal placental insertion.  Amniotic fluid Amount of AF: normal. MVP 6.8 cm. TEJAS 15.6 cm. Q1 3.7 cm, Q2 2.2 cm, Q3 2.9 cm, Q4 6.8 cm.        Fetal Anatomy  ============     Cranium:suboptimal  Head / Neck  Cranium:Due to  patient discomfort  Lips:Normal  Profile:Normal  Nose:Normal  4-chamber view:Appears normal  RVOT view:Normal  LVOT view:Normal  Heart / Thorax  3-vessel view:Normal  3-vessel-trachea view:normal  Cord insertion:Normal  Stomach:Appears normal  Kidneys:Appears normal  Bladder:Appears normal  Gender:male  Wants to know gender:yes        Fetal Doppler  ===========     Arterial  Umbilical A PI1.16          77%        Kwaku     Umbilical A RI0.69          66%        Kwaku     Umbilical A PS-31.70 cm/s     Umbilical A ED-9.80 cm/s  Umbilical A TAmax-18.82 cm/s     Umbilical A MD-9.21 cm/s  Umbilical A S / D3.23          60%        Kwaku     Umbilical A  bpm        Impression  =========     Size consistent with dates.     No fetal anomalies were identified.     Amniotic fluid volume is normal.     Umbilical artery S/D ratio is normal.     Good fetal movement noted.        Recommendation  ===============     Follow-up as clinically indicated.        Coding  ======     Description:32711-52 Follow Up Ultrasound           Assessment/Plan  IUP @ 28w0d with T2DM       1. T2DM  -Suboptimal control, limited monitoring, some patient non compliance, inconsistency  -Here for patterning.   -Lantus 20 qhs, Novolog 24/24/24. SSI prn. Will determine insulin needs and adjust accordingly.   -Pricila Schwartz RN has seen pt this am and synced Dexcom and did DE. PDC will follow FSBS weekly from dc.     2. Reg diet  -changed from CC bc pt reports she will likely be unable to follow CC diet at home    3. SCD    4. FWB reassuring  -normal growth  -NST TID      Lore Hankins MD  12/11/2024  08:45 EST      Electronically signed by Lore Hankins MD at 12/11/24 1101       Consult Notes (last 48 hours)  Notes from 12/09/24 1141 through 12/11/24 1141   No notes of this type exist for this encounter.       FHR (last 2 days)        Date/Time Fetal HR Assessment Method Fetal HR (beats/min) Fetal HR Baseline Fetal HR Variability Fetal HR  Accelerations Fetal HR Decelerations Fetal HR Tracing Category    12/10/24 2152 external  150  normal range  moderate (amplitude range 6 to 25 bpm)  greater than/equal to 15 bpm;lasting at least 15 seconds  absent  --     12/10/24 1838 external  135  normal range  moderate (amplitude range 6 to 25 bpm)  greater than/equal to 15 bpm;lasting at least 15 seconds  absent  --     12/10/24 1830 external  --  poor tracing; pt moving in room  indeterminate  --  --  --  --     12/10/24 1815 external  140  normal range  moderate (amplitude range 6 to 25 bpm)  greater than/equal to 15 bpm;lasting at least 15 seconds  absent  --     12/10/24 1800 external  135  normal range  moderate (amplitude range 6 to 25 bpm)  greater than/equal to 15 bpm;lasting at least 15 seconds  absent  --     12/10/24 1745 external  --  poor tracing; efm adjusted  indeterminate  --  --  --  --     12/10/24 1730 external  145  normal range  moderate (amplitude range 6 to 25 bpm)  greater than/equal to 15 bpm;lasting at least 15 seconds  absent  --     12/10/24 1715 external  145  normal range  moderate (amplitude range 6 to 25 bpm)  greater than/equal to 15 bpm;lasting at least 15 seconds  absent  --     12/10/24 1700 external  140  normal range  moderate (amplitude range 6 to 25 bpm)  greater than/equal to 15 bpm;lasting at least 15 seconds  absent  --                   Uterine Activity (last 2 days)        Date/Time Method Contraction Frequency (Minutes) Contraction Duration (sec) Contraction Intensity Uterine Resting Tone Contraction Pattern    12/10/24 2152 external tocotransducer  --  --  no contractions  --  --     12/10/24 1838 external tocotransducer  --  --  no contractions  --  --     12/10/24 1830 external tocotransducer  --  --  no contractions  --  --     12/10/24 1815 external tocotransducer  --  --  no contractions  --  --     12/10/24 1800 external tocotransducer  --  --  no contractions  --  --     12/10/24 1745 external tocotransducer   --  --  no contractions  --  --     12/10/24 1730 external tocotransducer  --  --  no contractions  --  --     12/10/24 1715 external tocotransducer  --  --  no contractions  --  --     12/10/24 1700 external tocotransducer  --  --  no contractions  --  --

## 2024-12-12 VITALS
TEMPERATURE: 98.2 F | DIASTOLIC BLOOD PRESSURE: 52 MMHG | HEART RATE: 88 BPM | SYSTOLIC BLOOD PRESSURE: 95 MMHG | BODY MASS INDEX: 43.77 KG/M2 | HEIGHT: 68 IN | RESPIRATION RATE: 16 BRPM | WEIGHT: 288.8 LBS

## 2024-12-12 LAB
GLUCOSE BLDC GLUCOMTR-MCNC: 109 MG/DL (ref 70–130)
GLUCOSE BLDC GLUCOMTR-MCNC: 134 MG/DL (ref 70–130)

## 2024-12-12 PROCEDURE — 63710000001 INSULIN LISPRO (HUMAN) PER 5 UNITS: Performed by: OBSTETRICS & GYNECOLOGY

## 2024-12-12 PROCEDURE — 82948 REAGENT STRIP/BLOOD GLUCOSE: CPT

## 2024-12-12 RX ORDER — INSULIN LISPRO 100 [IU]/ML
28 INJECTION, SOLUTION INTRAVENOUS; SUBCUTANEOUS
Status: DISCONTINUED | OUTPATIENT
Start: 2024-12-12 | End: 2024-12-12 | Stop reason: HOSPADM

## 2024-12-12 RX ORDER — INSULIN LISPRO 100 [IU]/ML
28 INJECTION, SOLUTION INTRAVENOUS; SUBCUTANEOUS
Qty: 2520 UNITS | Refills: 0 | Status: SHIPPED | OUTPATIENT
Start: 2024-12-12 | End: 2024-12-18

## 2024-12-12 RX ADMIN — INSULIN LISPRO 24 UNITS: 100 INJECTION, SOLUTION INTRAVENOUS; SUBCUTANEOUS at 08:02

## 2024-12-12 NOTE — PROGRESS NOTES
[facility]  Antepartum Progress Note    Chief Complaint: HD 3  Subjective   Feels well overall. Ready to go home.. No other complaints. + FM, no VB, ctx, LOF      Objective     Vital Signs Range for the last 24 hours  Temp:  [98 °F (36.7 °C)-98.2 °F (36.8 °C)] 98.2 °F (36.8 °C)   BP: ()/(52-66) 95/52   Heart Rate:  [86-95] 88   Resp:  [16-18] 16                 Fetal Heart Rate Assessment   Method: Fetal HR Assessment Method: external   Beats/min: Fetal HR (beats/min): 140   Baseline: Fetal HR Baseline: normal range   Variability: Fetal HR Variability: moderate (amplitude range 6 to 25 bpm)   Accels: Fetal HR Accelerations: greater than/equal to 15 bpm, lasting at least 15 seconds   Decels: Fetal HR Decelerations: absent   Tracing Category:  1     Uterine Assessment   Method: Method: external tocotransducer   Frequency (min):     Ctx Count in 10 min:     Duration:     Intensity: Contraction Intensity: no contractions   Intensity by IUPC:     Resting Tone:     Resting Tone by IUPC:          Physical Exam:  General: Patient is comfortable and well appearing   Chest No deformities, nonlabored breathing   Abdomen S/gravid/NT   Extremities No edema       Laboratory Results  CBC:      Lab 12/10/24  1724   WBC 15.71*   HEMOGLOBIN 11.8*   HEMATOCRIT 34.8   PLATELETS 203   MCV 91.6        Component      Latest Ref Rn 2024  6:52 PM 2024  9:31 PM 2024  12:16 AM 2024  7:46 AM   Glucose      70 - 130 mg/dL 192 (H)  156 (H)  134 (H)  109            Ultrasound Results: [facility] [[id number]] (Order [id number])  Order  Status: Final result     Study Result    PAT NAME: SAQIB ARMIJO  MED REC#: [id number]  BIRTH DA: 1990  PAT GEND: F  ACCOUNT#: [id number]  PAT TYPE: O  EXAM FLORENTIN: [id number]  REF PHYS LILIANA CEBALLOS  ACCESSION [id number]            Impression  =========     Size consistent with dates.     No fetal anomalies were identified.     Amniotic fluid volume is  normal.     Umbilical artery S/D ratio is normal.     Good fetal movement noted.        Recommendation  ===============     Follow-up as clinically indicated.        Coding  ======     Description:19595-81 Follow Up Ultrasound           Assessment/Plan  IUP @ 28w1d with poorly controlled T2DM, admitted for patterning       1. T2DM  -Suboptimal control, limited monitoring, some patient non compliance, inconsistency  -per Dr. Ramos's recommendations, will increase insulin: Lantus 22 qhs, Novolog 28 with meals. SSI prn. MEERA Schwartz RN has seen pt this am and synced Dexcom and did DE. PDC will follow FSBS weekly from dc. OK for d/c home per MFM.    2. Reg diet  -changed from CC bc pt reports she will likely be unable to follow CC diet at home    3. SCD    4. FWB reassuring  -normal growth  -NST TID      Mickie Noland MD  12/12/2024  08:49 EST

## 2024-12-12 NOTE — DISCHARGE SUMMARY
[facility]   Discharge Summary      Patient: Henna Campuzano      MR#:[id number]  Admission  Diagnosis:   Poorly controlled T2DM in pregnancy    Discharge Diagnosis: same    Date of Admission: 12/10/2024  Date of Discharge:  12/12/2024    Procedures:  none    Service:  Obstetrics    Hospital Course:  Patient admitted for diabetic patterning.  Insulin regimen adjusted by MFM.  Normal US at [facility].  During admission she remained afebrile and hemodynamically stable.  On the day of discharge, she was eating, ambulating and voiding without difficulty.      Labs  Lab Results   Component Value Date    WBC 15.71 (H) 12/10/2024    HGB 11.8 (L) 12/10/2024    HCT 34.8 12/10/2024    MCV 91.6 12/10/2024     12/10/2024    POCGLU 109 12/12/2024    CREATININE 0.54 (L) 12/10/2024    URICACID 3.2 08/05/2024    AST 8 12/10/2024    ALT 7 12/10/2024     08/05/2024           Discharge Medications     Discharge Medications        New Medications        Instructions Start Date   insulin glargine 100 UNIT/ML injection  Commonly known as: LANTUS, SEMGLEE   22 Units, Subcutaneous, Nightly      Insulin Lispro 100 UNIT/ML injection  Commonly known as: humaLOG  Replaces: Insulin Lispro (1 Unit Dial) 100 UNIT/ML solution pen-injector   28 Units, Subcutaneous, 3 Times Daily With Meals             Continue These Medications        Instructions Start Date   acetone (urine) test strip   1 strip, Not Applicable, Daily      aspirin 81 MG EC tablet   81 mg, Daily      Dexcom G7 Sensor misc   1 each, Not Applicable, Every 10 Days      Insulin Pen Needle 32G X 4 MM misc   1 each, Not Applicable, 4 Times Daily      NIFEdipine XL 30 MG 24 hr tablet  Commonly known as: PROCARDIA XL   30 mg, Daily      ONE TOUCH ULTRA 2 w/Device kit   See Admin Instructions      OneTouch Verio test strip  Generic drug: glucose blood   See Admin Instructions      prenatal vitamin 27-0.8 27-0.8 MG tablet tablet   1 tablet, Daily             Stop These  Medications      Insulin Glargine 100 UNIT/ML injection pen  Commonly known as: LANTUS SOLOSTAR     Insulin Lispro (1 Unit Dial) 100 UNIT/ML solution pen-injector  Commonly known as: HumaLOG KwikPen  Replaced by: Insulin Lispro 100 UNIT/ML injection              Discharge Disposition:  To Home    Discharge Condition:  Stable    Discharge Diet: carb consistent    Activity at Discharge: as tolerated    Follow-up Appointments  1 week    Mickie Noland MD  12/12/24  08:57 EST

## 2024-12-12 NOTE — PAYOR COMM NOTE
"JustenJarenHennakomal Spangler (34 y.o. Female)     From:Eryn Greenberg LPN, Utilization Review  Phone #[phone]  Fax #[phone]    Passport ID#[id number]     Medical Inpatient Admission.    Has this been approved?    Discharged 12/12/24.          Date of Birth   1990    Social Security Number       Address   [address]  [place] [address]    Home Phone   [phone]    MRN   [id number]       Hinduism   None    Marital Status   Significant Other                            Admission Date   12/10/24    Admission Type   Elective    Admitting Provider   Edna Muller DO    Attending Provider       Department, Room/Bed   [facility] ANTEPARTUM, N326/1       Discharge Date   12/12/2024    Discharge Disposition   Home or Self Care    Discharge Destination                                 Attending Provider: (none)   Allergies: Sulfamethoxazole-trimethoprim    Isolation: None   Infection: None   Code Status: Prior    Ht: 172.7 cm (68\")   Wt: 131 kg (288 lb 12.8 oz)    Admission Cmt: None   Principal Problem: Gestational diabetes [O24.419]                   Active Insurance as of 12/10/2024       Primary Coverage       Payor Plan Insurance Group Employer/Plan Group    [employer] BY SAURAV BalancedBETSY BY SAURAV [id number]       Payor Plan Address Payor Plan Phone Number Payor Plan Fax Number Effective Dates    [address] [address]   1/1/2021 - None Entered    [facility] [id number]         Subscriber Name Subscriber Birth Date Member ID       HENNA CAMPUZANO 1990 [id number]                     Emergency Contacts        (Rel.) Home Phone Work Phone Mobile Phone    DEONDRE RODRIGUEZ (Spouse) [phone] -- [phone]    HENNA JOHNSON (Mother) [phone] -- [phone]              Insurance Information                  [employer] BY SAURAV/BalancedBETSY BY SAURAV Phone: --    Subscriber: Henna Campuzano J Luis Subscriber#: [id number] " "   Group#: [id number] Precert#: --    Authorization#: -- Effective Date: --             History & Physical        Ambrocio Dotson MD at 12/10/24 1959          [facility]  Obstetric History and Physical    Chief Complaint   Patient presents with    Decreased Fetal Movement    glucose control        HPI:      Patient is a 34 y.o. female  currently at 27w6d, who presents after being asked by her providers due to poorly controlled glucose.  She has been increasing her insulin levels on her own due to increased glucose levels despite her regular regimen.   She denies any other current issues including N/VF/C.   Denies chest pain and SOA.    Denies contractions, vaginal leaking and bleeding.   She did have decreased fetal movement over  the last day or so, but now feels movement with fetal monitors in place.       The following portions of the patients history were reviewed and updated as appropriate: current medications, allergies, past medical history, past surgical history, past family history, past social history and problem list .       Prenatal Information:   Maternal Prenatal Labs  Blood Type No results found for: \"ABO\"   Rh Status No results found for: \"RH\"   Antibody Screen No results found for: \"ABSCRN\"   Gonnorhea No results found for: \"GCCX\"   Chlamydia No results found for: \"CLAMYDCU\"   RPR No results found for: \"RPR\"   Syphilis Antibody No results found for: \"SYPHILIS\"   Rubella No results found for: \"RUBELLAIGGIN\"   Hepatitis B Surface Antigen No results found for: \"HEPBSAG\"   HIV-1 Antibody No results found for: \"LABHIV1\"   Hepatitis C Antibody No results found for: \"HEPCAB\"   Rapid Urin Drug Screen No results found for: \"AMPMETHU\", \"BARBITSCNUR\", \"LABBENZSCN\", \"LABMETHSCN\", \"LABOPIASCN\", \"THCURSCR\", \"COCAINEUR\", \"AMPHETSCREEN\", \"PROPOXSCN\", \"BUPRENORSCNU\", \"METAMPSCNUR\", \"OXYCODONESCN\", \"TRICYCLICSCN\"   Group B Strep Culture No results found for: \"GBSANTIGEN\"           External Prenatal " Results       Pregnancy Outside Results - Transcribed From Office Records - See Scanned Records For Details       Test Value Date Time    ABO  A  08/05/24 1440    Rh  Positive  08/05/24 1440    Antibody Screen  Negative  08/05/24 1440    Varicella IgG  393 index 08/05/24 1440    Rubella  1.18 index 08/05/24 1440    Hgb  11.8 g/dL 12/10/24 1724       13.2 g/dL 08/11/24 1606       14.2 g/dL 08/05/24 1440    Hct  34.8 % 12/10/24 1724       39.4 % 08/11/24 1606       41.5 % 08/05/24 1440    HgB A1c   6.20 % 12/10/24 1724       6.0 % 08/19/24 1457       5.70 % 08/05/24 1440       6.1 % 07/16/24 1034    1h GTT       3h GTT Fasting       3h GTT 1 hour       3h GTT 2 hour       3h GTT 3 hour        Gonorrhea (discrete)  Negative  08/05/24 1440    Chlamydia (discrete)  Negative  08/05/24 1440    RPR       Syphils cascade: TP-Ab (FTA)  Non-Reactive  08/05/24 1440    TP-Ab  Non-Reactive  08/05/24 1440    TP-Ab (EIA)       TPPA       HBsAg  Non-Reactive  08/05/24 1440    Herpes Simplex Virus PCR       Herpes Simplex VIrus Culture       HIV  Non-Reactive  08/05/24 1440    Hep C RNA Quant PCR       Hep C Antibody       AFP       NIPT       Cystic Fibrosis (Khoi)       Cystic Fibroisis        Spinal Muscular atrophy       Fragile X       Group B Strep       GBS Susceptibility to Clindamycin       GBS Susceptibility to Erythromycin       Fetal Fibronectin       Genetic Testing, Maternal Blood                 Drug Screening       Test Value Date Time    Urine Drug Screen       Amphetamine Screen  Negative  08/05/24 1440    Barbiturate Screen  Negative  08/05/24 1440    Benzodiazepine Screen  Negative  08/05/24 1440    Methadone Screen  Negative  08/05/24 1440    Phencyclidine Screen  Negative  08/05/24 1440    Opiates Screen  Negative  08/05/24 1440    THC Screen  Negative  08/05/24 1440    Cocaine Screen       Propoxyphene Screen       Buprenorphine Screen  Negative  08/05/24 1440    Methamphetamine Screen       Oxycodone Screen   Negative  24 1440    Tricyclic Antidepressants Screen  Negative  24 1440              Legend    ^: Historical                              Past OB History:     OB History    Para Term  AB Living   11 1 0 1 9 1   SAB IAB Ectopic Molar Multiple Live Births   9 0 0 0 0 1      # Outcome Date GA Lbr Kevan/2nd Weight Sex Type Anes PTL Lv   11 Current            10 2016 6w0d    SAB      9 SAB 2015 12w0d    SAB      8 SAB  6w0d    SAB      7 SAB 2013 6w0d    SAB      6 SAB 2013 6w0d    SAB      5 SAB 2011 8w0d    SAB      4 SAB 2011 6w0d    SAB      3 SAB  6w0d    SAB      2 SAB  6w0d    SAB      1  08 34w0d  227 g (8 oz) M Vag-Vacuum EPI  FERNANDO      Birth Comments: preeclampsia; GDM      Obstetric Comments   FOB #1 Pregnancy #1, 2, 3   FOB #2 Pregnancy #4 SAB, D&C, #5-10   FOB #3 Pregnancy #1  current   9 miscarriages       Past Medical History: Past Medical History:   Diagnosis Date    Fibromyalgia     Gestational diabetes     Hypertension     Rheumatoid arthritis     Type 2 diabetes mellitus       Past Surgical History Past Surgical History:   Procedure Laterality Date    DILATATION AND CURETTAGE      EAR RECONSTRUCTION      LAPAROSCOPIC CHOLECYSTECTOMY      MOUTH SURGERY  2004    NOSE SURGERY  2004    TONSILLECTOMY AND ADENOIDECTOMY        Family History: Family History   Problem Relation Age of Onset    Mental illness Mother     Hypertension Father     Diabetes Father     Fibromyalgia Father     Rheum arthritis Father     Fibromyalgia Sister     Rheum arthritis Sister     Crohn's disease Sister     Lupus Sister     Stroke Other     Heart attack Other     Diabetes Other     Liver disease Other       Social History:  reports that she has been smoking cigarettes. She has never used smokeless tobacco.   reports no history of alcohol use.   reports no history of drug use.       Objective     Vital Signs Range for the last 24 hours  Temperature: Temp:  [98.5  °F (36.9 °C)] 98.5 °F (36.9 °C)   Temp Source: Temp src: Oral   BP: BP: (132)/(72) 132/72   Pulse: Heart Rate:  [110] 110   Respirations: Resp:  [16] 16   SPO2:     O2 Amount (l/min):     O2 Devices     Weight: Weight:  [131 kg (288 lb 12.8 oz)] 131 kg (288 lb 12.8 oz)     Physical Examination: General appearance - alert, well appearing, and in no distress and oriented to person, place, and time  Chest -Breathing is unlaboured   Heart - Tachycardia rate   Abdomen - soft, nontender, nondistended,  gravid uterus   Extremities - pedal edema +2      Fetal Heart Rate Assessment   Method: Fetal HR Assessment Method: external   Beats/min: Fetal HR (beats/min): 135   Baseline: Fetal HR Baseline: normal range   Varibility: Fetal HR Variability: moderate (amplitude range 6 to 25 bpm)   Accels: Fetal HR Accelerations: greater than/equal to 15 bpm, lasting at least 15 seconds   Decels: Fetal HR Decelerations: absent   Tracing Category:       Uterine Assessment   Method: Method: external tocotransducer   Frequency (min):     Ctx Count in 10 min:     Duration:     Intensity: Contraction Intensity: no contractions   Intensity by IUPC:     Resting Tone:     Resting Tone by IUPC:           Laboratory Results:   Lab Results   Component Value Date     12/10/2024    HGB 11.8 (L) 12/10/2024    HCT 34.8 12/10/2024    WBC 15.71 (H) 12/10/2024      Lab Results   Component Value Date     12/10/2024    K 3.9 12/10/2024     12/10/2024    CO2 21.0 (L) 12/10/2024    BUN 8 12/10/2024    CREATININE 0.54 (L) 12/10/2024    GLUCOSE 184 (H) 12/10/2024    ALBUMIN 3.2 (L) 12/10/2024    CALCIUM 8.9 12/10/2024    AST 8 12/10/2024    ALT 7 12/10/2024    BILITOT <0.2 12/10/2024      HGB A1C   6.20     Lab Results   Component Value Date    SQUAMEPIUA 7-12 (A) 12/10/2024    SPECGRAVUR 1.032 (H) 12/10/2024    KETONESU Trace (A) 12/10/2024    BLOODU Negative 12/10/2024    LEUKOCYTESUR Negative 12/10/2024    NITRITEU Negative 12/10/2024     RBCUA None Seen 12/10/2024    WBCUA 0-2 12/10/2024    BACTERIA 3+ (A) 12/10/2024            Assessment:  1. Intrauterine pregnancy at 27w6d weeks gestation with reactive fetal status  2. IDDM - poorly controlled  3. CHTN  4. Poor OB history  5. Decreased fetal movement  6. AMA    Plan:  1. Admit to APU  2. Speaking with , will start with her usual insulin regime of 18 units long acting nightly and 20 units of short acting with meals.   Will do sliding scale for coverage and adjust her regular regimen depending on the correction doing requirements.    3.  Continue current HTN meds  4.  TID fetal monitoring for 1 hour with NST      Ambrocio Dotson MD  12/10/2024  19:59 EST    Electronically signed by Ambrocio Dotson MD at 12/10/24 2042       No current facility-administered medications on file as of 12/12/2024.     Lab Results (last 72 hours)       Procedure Component Value Units Date/Time    POC Glucose Once [[id number]]  (Normal) Collected: 12/12/24 0746    Specimen: Blood Updated: 12/12/24 0748     Glucose 109 mg/dL     POC Glucose Once [[id number]]  (Abnormal) Collected: 12/12/24 0016    Specimen: Blood Updated: 12/12/24 0017     Glucose 134 mg/dL     POC Glucose Once [[id number]]  (Abnormal) Collected: 12/11/24 2131    Specimen: Blood Updated: 12/11/24 2134     Glucose 156 mg/dL     POC Glucose Once [[id number]]  (Abnormal) Collected: 12/11/24 1852    Specimen: Blood Updated: 12/11/24 1854     Glucose 192 mg/dL     POC Glucose Once [[id number]]  (Normal) Collected: 12/11/24 1037    Specimen: Blood Updated: 12/11/24 1039     Glucose 113 mg/dL     POC Glucose Once [[id number]]  (Normal) Collected: 12/11/24 0751    Specimen: Blood Updated: 12/11/24 0757     Glucose 118 mg/dL     POC Glucose Once [[id number]]  (Abnormal) Collected: 12/10/24 2225    Specimen: Blood Updated: 12/10/24 2227     Glucose 157 mg/dL     POC Glucose Once [[id number]]  (Abnormal) Collected: 12/10/24 1934    Specimen: Blood Updated: 12/10/24  1936     Glucose 169 mg/dL     Urinalysis, Microscopic Only - Urine, Clean Catch [[id number]]  (Abnormal) Collected: 12/10/24 1730    Specimen: Urine, Clean Catch Updated: 12/10/24 1838     RBC, UA None Seen /HPF      WBC, UA 0-2 /HPF      Bacteria, UA 3+ /HPF      Squamous Epithelial Cells, UA 7-12 /HPF      Hyaline Casts, UA 0-6 /LPF      Methodology Manual Light Microscopy    Hemoglobin A1c [[id number]]  (Abnormal) Collected: 12/10/24 1724    Specimen: Blood Updated: 12/10/24 1818     Hemoglobin A1C 6.20 %     Narrative:      Hemoglobin A1C Ranges:    Increased Risk for Diabetes  5.7% to 6.4%  Diabetes                     >= 6.5%  Diabetic Goal                < 7.0%    Comprehensive Metabolic Panel [[id number]]  (Abnormal) Collected: 12/10/24 1724    Specimen: Blood Updated: 12/10/24 1816     Glucose 184 mg/dL      BUN 8 mg/dL      Creatinine 0.54 mg/dL      Sodium 137 mmol/L      Potassium 3.9 mmol/L      Chloride 102 mmol/L      CO2 21.0 mmol/L      Calcium 8.9 mg/dL      Total Protein 6.1 g/dL      Albumin 3.2 g/dL      ALT (SGPT) 7 U/L      AST (SGOT) 8 U/L      Alkaline Phosphatase 90 U/L      Total Bilirubin <0.2 mg/dL      Globulin 2.9 gm/dL      Comment: Calculated Result        A/G Ratio 1.1 g/dL      BUN/Creatinine Ratio 14.8     Anion Gap 14.0 mmol/L      eGFR 122.5 mL/min/1.73     Narrative:      GFR Categories in Chronic Kidney Disease (CKD)      GFR Category          GFR (mL/min/1.73)    Interpretation  G1                     90 or greater         Normal or high (1)  G2                      60-89                Mild decrease (1)  G3a                   45-59                Mild to moderate decrease  G3b                   30-44                Moderate to severe decrease  G4                    15-29                Severe decrease  G5                    14 or less           Kidney failure          (1)In the absence of evidence of kidney disease, neither GFR category G1 or G2 fulfill the criteria for  CKD.    eGFR calculation  CKD-EPI creatinine equation, which does not include race as a factor    Urinalysis With Microscopic If Indicated (No Culture) - Urine, Clean Catch [[id number]]  (Abnormal) Collected: 12/10/24 173    Specimen: Urine, Clean Catch Updated: 12/10/24 1800     Color, UA Yellow     Appearance, UA Cloudy     pH, UA 6.0     Specific Gravity, UA 1.032     Glucose, UA >=1000 mg/dL (3+)     Ketones, UA Trace     Bilirubin, UA Negative     Blood, UA Negative     Protein, UA Trace     Leuk Esterase, UA Negative     Nitrite, UA Negative     Urobilinogen, UA 1.0 E.U./dL    CBC (No Diff) [[id number]]  (Abnormal) Collected: 12/10/24 1724    Specimen: Blood Updated: 12/10/24 1755     WBC 15.71 10*3/mm3      RBC 3.80 10*6/mm3      Hemoglobin 11.8 g/dL      Hematocrit 34.8 %      MCV 91.6 fL      MCH 31.1 pg      MCHC 33.9 g/dL      RDW 13.1 %      RDW-SD 43.6 fl      MPV 11.3 fL      Platelets 203 10*3/mm3           Imaging Results (Last 72 Hours)       Procedure Component Value Units Date/Time    [facility] [[id number]] Collected: 24     Updated: 24    Narrative:      PAT NAME: SAQIB ARMIJO  [facility] REC#: [id number]  BIRTH DA: [id number]  PAT GEND: F  ACCOUNT#: [id number]  PAT TYPE: O  EXAM FLORENTIN: [id number]  REF PHYS LILIANA CEBALLOS  ACCESSION [id number]      Comparison Studies  =================    The findings of this study are compared to the prior ultrasound study dated 24      Patient Status  ============    Inpatient      Indication  ========    Type 2 DM, (poor control) Decreased fetal movement, UTI      Maternal Assessment  ==================    Height 172 cm  Height (ft) 5 ft  Height (in) 8 in  Weight 131 kg  Weight (lb) 289 lb  BMI 44.28 kg/m²      Method  =======    Transabdominal ultrasound examination. View: Suboptimal view: restricted by patient discomfort      Pregnancy  =========    [place] pregnancy. Number of fetuses:  1      Dating  ======    GA by prior assessment 28 w + 0 d  XIOMARA by prior assessment: 3/5/2025  Ultrasound examination on: 12/11/2024  GA by U/S based upon: AC, BPD, Femur, HC  GA by U/S 28 w + 0 d  XIOMARA by U/S: 3/5/2025  Method of dating: Restore dating from previous exam  Previous dating: based on stated XIOMARA, selected on 11/18/2024  Agreed XIOMARA of previous dating: 3/5/2025  Assigned: based on stated XIOMARA, selected on 11/18/2024  Assigned GA 28 w + 0 d  Assigned XIOMARA: 3/5/2025  Pregnancy length 280 d      Fetal Biometry  ============    Standard  BPD 70.5 mm  28w 2d        49%        Hadlock    OFD 80.5 mm  26w 1d        6%        Keisha    .2 mm  27w 1d        6%        Hadlock    .6 mm  27w 4d        28%        Hadlock    Femur 55.4 mm  29w 1d        70%        Hadlock    HC / AC 1.08    EFW 1,175 g          53%        Jonathan    EFW (lb) 2 lb  EFW (oz) 9 oz  EFW by: Hadlock (BPD-HC-AC-FL)  Head / Face / Neck  Cephalic index 0.88          >99%        Nicolaides    Extremities / Bony Struc  FL / BPD 0.79    FL / HC 0.22    FL / AC 0.24    Other Structures   bpm      General Evaluation  ================    Cardiac activity present.  bpm.  Fetal movements present.  Presentation cephalic.  Placenta anterior, right lateral.  Umbilical cord 3 vessel cord; normal placental insertion.  Amniotic fluid Amount of AF: normal. MVP 6.8 cm. TEJAS 15.6 cm. Q1 3.7 cm, Q2 2.2 cm, Q3 2.9 cm, Q4 6.8 cm.      Fetal Anatomy  ============    Cranium: suboptimal  Head / Neck  Cranium: Due to patient discomfort  Lips: Normal  Profile: Normal  Nose: Normal  4-chamber view: Appears normal  RVOT view: Normal  LVOT view: Normal  Heart / Thorax  3-vessel view: Normal  3-vessel-trachea view: normal  Cord insertion: Normal  Stomach: Appears normal  Kidneys: Appears normal  Bladder: Appears normal  Gender: male  Wants to know gender: yes      Fetal Doppler  ===========    Arterial  Umbilical A PI 1.16          77%         Kwaku    Umbilical A RI 0.69          66%        Kwaku    Umbilical A PS -31.70 cm/s    Umbilical A ED -9.80 cm/s  Umbilical A TAmax -18.82 cm/s    Umbilical A MD -9.21 cm/s  Umbilical A S / D 3.23          60%        Kwaku    Umbilical A  bpm      Impression  =========    Size consistent with dates.    No fetal anomalies were identified.    Amniotic fluid volume is normal.    Umbilical artery S/D ratio is normal.    Good fetal movement noted.      Recommendation  ===============    Follow-up as clinically indicated.      Coding  ======    Description: [id number] Follow Up Ultrasound        Sonographer: Bambi Joy [facility]  Physician: Doug Milligan, MD, FACOG    Electronically signed by: Doug Milligan, MD, FACOG at: 2024/12/11 07:47          Orders (last 72 hrs)        Start     Ordered    12/12/24 2100  insulin glargine (LANTUS, SEMGLEE) injection 22 Units  Nightly,   Status:  Discontinued         12/12/24 0846    12/12/24 1200  Insulin Lispro (humaLOG) injection 28 Units  3 Times Daily With Meals,   Status:  Discontinued         12/12/24 0846    12/12/24 0847  Place Sequential Compression Device  Once,   Status:  Canceled         12/12/24 0846    12/12/24 0847  Maintain Sequential Compression Device  Continuous,   Status:  Canceled         12/12/24 0846    12/12/24 0847  Discharge patient  Once         12/12/24 0848    12/12/24 0749  POC Glucose Once  PROCEDURE ONCE        Comments: Complete no more than 45 minutes prior to patient eating      12/12/24 0746    12/12/24 0018  POC Glucose Once  PROCEDURE ONCE        Comments: Complete no more than 45 minutes prior to patient eating      12/12/24 0016    12/12/24 0000  insulin glargine (LANTUS, SEMGLEE) 100 UNIT/ML injection  Nightly         12/12/24 0848    12/12/24 0000  Insulin Lispro (humaLOG) 100 UNIT/ML injection  3 Times Daily With Meals         12/12/24 0848    12/11/24 2135  POC Glucose Once  PROCEDURE ONCE        Comments: Complete no more than 45  minutes prior to patient eating      24 2131    24 2100  insulin glargine (LANTUS, SEMGLEE) injection 20 Units  Nightly,   Status:  Discontinued         24 0902    24 1855  POC Glucose Once  PROCEDURE ONCE        Comments: Complete no more than 45 minutes prior to patient eating      24 1852    24 1200  Insulin Lispro (humaLOG) injection 24 Units  3 Times Daily With Meals,   Status:  Discontinued         24 0902    24 1040  POC Glucose Once  PROCEDURE ONCE        Comments: Complete no more than 45 minutes prior to patient eating      24 1037    24 0900  NIFEdipine XL (PROCARDIA XL) 24 hr tablet 30 mg  Every 24 Hours Scheduled,   Status:  Discontinued         12/10/24 18524 0854  Diet: Regular/House; Fluid Consistency: Thin (IDDSI 0)  Diet Effective Now,   Status:  Canceled         24 0853    24 0854  Diabetes Educator Consult  Once,   Status:  Canceled        Provider:  (Not yet assigned)    24 0853    24 0845  Admit To Obstetrics Inpatient  Once         24 0844    24 0845  Code Status and Medical Interventions: CPR (Attempt to Resuscitate); Full Support  Continuous,   Status:  Canceled         24 0844    24 0822  acetaminophen (TYLENOL) tablet 1,000 mg  Every 6 Hours PRN,   Status:  Discontinued         24 0822    24 0800  Fetal Monitoring  3 Times Daily,   Status:  Canceled        Comments: TID 1 hr monitoring    12/10/24 1833    24 0758  POC Glucose Once  PROCEDURE ONCE        Comments: Complete no more than 45 minutes prior to patient eating      24 0751    24 0700  [facility]  [facility]         12/10/24 1831    12/10/24 2228  POC Glucose Once  PROCEDURE ONCE        Comments: Complete no more than 45 minutes prior to patient eating      12/10/24 2225    12/10/24 2100  Insulin Lispro (humaLOG) injection 2-9 Units  4 Times Daily Before  Meals & Nightly,   Status:  Discontinued         12/10/24 1818    12/10/24 2100  insulin glargine (LANTUS, SEMGLEE) injection 18 Units  Nightly,   Status:  Discontinued         12/10/24 1820    12/10/24 2100  prenatal vitamin tablet 1 tablet  Daily,   Status:  Discontinued         12/10/24 1857    12/10/24 2100  aspirin chewable tablet 81 mg  Daily,   Status:  Discontinued         12/10/24 1857    12/10/24 1937  POC Glucose Once  PROCEDURE ONCE        Comments: Complete no more than 45 minutes prior to patient eating      12/10/24 1934    12/10/24 1921  Update MD for blood sugars over 200  Nursing Communication  Once,   Status:  Canceled        Comments: Update MD for blood sugars over 200    12/10/24 1921    12/10/24 1900  POC Glucose 4x Daily Fasting & PC  4 Times Daily Fasting & After Meals,   Status:  Canceled      Comments: Complete no more than 45 minutes prior to patient eating      12/10/24 1818    12/10/24 1830  Insulin Lispro (humaLOG) injection 20 Units  3 Times Daily With Meals,   Status:  Discontinued         12/10/24 1820    12/10/24 1818  Diet: Diabetic; Consistent Carbohydrate; Texture: Regular (IDDSI 7); Fluid Consistency: Thin (IDDSI 0)  Diet Effective Now,   Status:  Canceled         12/10/24 1817    12/10/24 1817  dextrose (GLUTOSE) oral gel 15 g  Every 15 Minutes PRN,   Status:  Discontinued         12/10/24 1818    12/10/24 1817  dextrose (D50W) (25 g/50 mL) IV injection 25 g  Every 15 Minutes PRN,   Status:  Discontinued         12/10/24 1818    12/10/24 1817  glucagon (GLUCAGEN) injection 1 mg  Every 15 Minutes PRN,   Status:  Discontinued         12/10/24 1818    12/10/24 1817  Follow Hypoglycemia Standing Orders For Blood Glucose <70 & Notify Provider of Treatment  As Needed,   Status:  Canceled      Comments: Follow Hypoglycemia Orders As Outlined in Process Instructions (Open Order Report to View Full Instructions)  Notify Provider Any Time Hypoglycemia Treatment is Administered     12/10/24 1818    12/10/24 1748  Urinalysis, Microscopic Only - Urine, Clean Catch  Once         12/10/24 1747    12/10/24 1716  Maintain IV Access  Continuous,   Status:  Canceled         12/10/24 1715    12/10/24 1715  Hemoglobin A1c  STAT         12/10/24 1715    12/10/24 1715  Urinalysis With Microscopic If Indicated (No Culture) - Urine, Clean Catch  STAT         12/10/24 1715    12/10/24 1714  CBC (No Diff)  STAT         12/10/24 1715    12/10/24 1714  Comprehensive Metabolic Panel  STAT         12/10/24 1715                     Physician Progress Notes (last 72 hours)        Mickie Noland MD at 12/12/24 0849          [facility]  Antepartum Progress Note    Chief Complaint: HD 3  Subjective   Feels well overall. Ready to go home.. No other complaints. + FM, no VB, ctx, LOF      Objective     Vital Signs Range for the last 24 hours  Temp:  [98 °F (36.7 °C)-98.2 °F (36.8 °C)] 98.2 °F (36.8 °C)   BP: ()/(52-66) 95/52   Heart Rate:  [86-95] 88   Resp:  [16-18] 16                 Fetal Heart Rate Assessment   Method: Fetal HR Assessment Method: external   Beats/min: Fetal HR (beats/min): 140   Baseline: Fetal HR Baseline: normal range   Variability: Fetal HR Variability: moderate (amplitude range 6 to 25 bpm)   Accels: Fetal HR Accelerations: greater than/equal to 15 bpm, lasting at least 15 seconds   Decels: Fetal HR Decelerations: absent   Tracing Category:  1     Uterine Assessment   Method: Method: external tocotransducer   Frequency (min):     Ctx Count in 10 min:     Duration:     Intensity: Contraction Intensity: no contractions   Intensity by IUPC:     Resting Tone:     Resting Tone by IUPC:          Physical Exam:  General: Patient is comfortable and well appearing   Chest No deformities, nonlabored breathing   Abdomen S/gravid/NT   Extremities No edema       Laboratory Results  CBC:      Lab 12/10/24  1724   WBC 15.71*   HEMOGLOBIN 11.8*   HEMATOCRIT 34.8   PLATELETS 203   MCV 91.6         Component      Latest Ref Rng 2024  6:52 PM 2024  9:31 PM 2024  12:16 AM 2024  7:46 AM   Glucose      70 - 130 mg/dL 192 (H)  156 (H)  134 (H)  109            Ultrasound Results: US [facility]  Diagnostic Center [[id number]] (Order [id number])  Order  Status: Final result     Study Result    PAT NAME: SAQIB ARMIJO  [facility] REC#: [id number]  BIRTH DA: 1990  PAT GEND: F  ACCOUNT#: [id number]  PAT TYPE: O  EXAM FLORENTIN: [id number]  REF LILIANA SOW  ACCESSION [id number]            Impression  =========     Size consistent with dates.     No fetal anomalies were identified.     Amniotic fluid volume is normal.     Umbilical artery S/D ratio is normal.     Good fetal movement noted.        Recommendation  ===============     Follow-up as clinically indicated.        Coding  ======     Description:36155-41 Follow Up Ultrasound           Assessment/Plan  IUP @ 28w1d with poorly controlled T2DM, admitted for patterning       1. T2DM  -Suboptimal control, limited monitoring, some patient non compliance, inconsistency  -per Dr. Ramos's recommendations, will increase insulin: Lantus 22 qhs, Novolog 28 with meals. SSI prn. MEERA Schwartz RN has seen pt this am and synced Dexcom and did DE. PDC will follow FSBS weekly from dc. OK for d/c home per MFM.    2. Reg diet  -changed from CC bc pt reports she will likely be unable to follow CC diet at home    3. SCD    4. FWB reassuring  -normal growth  -NST TID      Mickie Noland MD  2024  08:49 EST      Electronically signed by Mickie Noland MD at 24 0851       Lore Hankins MD at 24 0845           Juan M  Antepartum Progress Note    Chief Complaint: HD 2    Subjective   Feels well overall. Concerned that diet here is not the same as what she eats at home. No other complaints. + FM no VB      Objective     Vital Signs Range for the last 24 hours  Temp:  [98.1 °F (36.7 °C)-98.5 °F (36.9 °C)] 98.1 °F (36.7 °C)   BP:  (115-132)/(56-72) 115/56   Heart Rate:  [] 92   Resp:  [16] 16                 Fetal Heart Rate Assessment   Method: Fetal HR Assessment Method: external   Beats/min: Fetal HR (beats/min): 150   Baseline: Fetal HR Baseline: normal range   Variability: Fetal HR Variability: moderate (amplitude range 6 to 25 bpm)   Accels: Fetal HR Accelerations: greater than/equal to 15 bpm, lasting at least 15 seconds   Decels: Fetal HR Decelerations: absent   Tracing Category:       Uterine Assessment   Method: Method: external tocotransducer   Frequency (min):     Ctx Count in 10 min:     Duration:     Intensity: Contraction Intensity: no contractions   Intensity by IUPC:     Resting Tone:     Resting Tone by IUPC:              Intake/Output last 24 hours:    No intake or output data in the 24 hours ending 24 0845    Intake/Output this shift:    No intake/output data recorded.    Physical Exam:  General: Patient is comfortable and well appearing   Heart CVS exam: normal rate and regular rhythm.   Lungs Chest: clear to auscultation, no wheezes, rales or rhonchi, symmetric air entry.     Abdomen S/gravid/NT   Extremities No edema       Laboratory Results  CBC:      Lab 12/10/24  1724   WBC 15.71*   HEMOGLOBIN 11.8*   HEMATOCRIT 34.8   PLATELETS 203   MCV 91.6     Blood Sugar in Office          2024    15:02 12/10/2024    19:34 12/10/2024    22:25 2024    07:51   Blood Sugar in Office   Glucose 151  169  157  118         Ultrasound Results: [facility]  Diagnostic Center [[id number]] (Order [id number])  Order  Status: Final result     Study Result    PAT NAME: SAQIB ARMIJO  [facility] REC#: [id number]  BIRTH DA: [id number]  PAT GEND: F  ACCOUNT#: [id number]  PAT TYPE: O  EXAM FLORENTIN: [id number]  REF PHYS LILIANA CEBALLOS  ACCESSION [id number]        Comparison Studies  =================     The findings of this study are compared to the prior ultrasound study dated 24        Patient Status  ============      Inpatient        Indication  ========     Type 2 DM, (poor control) Decreased fetal movement, UTI        Maternal Assessment  ==================     Ajxlfl061 cm  Height (ft)5 ft  Height (in)8 in  Hbswlz970 kg  Weight (lb)289 lb  BMI44.28 kg/m²        Method  =======     Transabdominal ultrasound examination. View: Suboptimal view: restricted by patient discomfort        Pregnancy  =========     [place] pregnancy. Number of fetuses: 1        Dating  ======     GA by prior [id number] w + 0 d  XIOMARA by prior assessment:3/5/2025  Ultrasound examination on:12/11/2024  GA by U/S based upon:AC, BPD, Femur, HC  GA by U/S28 w + 0 d  XIOMARA by U/S:3/5/2025  Method of dating:Restore dating from previous exam  Previous dating:based on stated XIOMARA, selected on 11/18/2024  Agreed XIOMARA of previous dating:3/5/2025  Assigned:based on stated XIOMARA, selected on 11/18/2024  Assigned GA28 w + 0 d  Assigned XIOMARA:3/5/2025  Pregnancy egmrgj055 d        Fetal Biometry  ============     Standard  BPD70.5 mm  28w 2d        49%        Hadlock     OFD80.5 mm  26w 1d        6%        Keisha     .2 mm  27w 1d        6%        Hadlock     .6 mm  27w 4d        28%        Hadlock     Femur55.4 mm  29w 1d        70%        Hadlock     HC / AC1.08     EFW1,175 g          53%        Jonathan     EFW (lb)2 lb  EFW (oz)9 oz  EFW by:Hadlock (BPD-HC-AC-FL)  Head / Face / Neck  Cephalic index0.88          >99%        Nicolaides     Extremities / Bony Struc  FL / BPD0.79     FL / HC0.22     FL / AC0.24     Other Structures  NBR898 bpm        General Evaluation  ================     Cardiac activity present.  bpm.  Fetal movements present.  Presentation cephalic.  Placenta anterior, right lateral.  Umbilical cord 3 vessel cord; normal placental insertion.  Amniotic fluid Amount of AF: normal. MVP 6.8 cm. TEJAS 15.6 cm. Q1 3.7 cm, Q2 2.2 cm, Q3 2.9 cm, Q4 6.8 cm.        Fetal Anatomy  ============     Cranium:suboptimal  Head / Neck  Cranium:Due to  patient discomfort  Lips:Normal  Profile:Normal  Nose:Normal  4-chamber view:Appears normal  RVOT view:Normal  LVOT view:Normal  Heart / Thorax  3-vessel view:Normal  3-vessel-trachea view:normal  Cord insertion:Normal  Stomach:Appears normal  Kidneys:Appears normal  Bladder:Appears normal  Gender:male  Wants to know gender:yes        Fetal Doppler  ===========     Arterial  Umbilical A PI1.16          77%        Kwaku     Umbilical A RI0.69          66%        Kwaku     Umbilical A PS-31.70 cm/s     Umbilical A ED-9.80 cm/s  Umbilical A TAmax-18.82 cm/s     Umbilical A MD-9.21 cm/s  Umbilical A S / D3.23          60%        Kwaku     Umbilical A  bpm        Impression  =========     Size consistent with dates.     No fetal anomalies were identified.     Amniotic fluid volume is normal.     Umbilical artery S/D ratio is normal.     Good fetal movement noted.        Recommendation  ===============     Follow-up as clinically indicated.        Coding  ======     Description:99858-84 Follow Up Ultrasound           Assessment/Plan  IUP @ 28w0d with T2DM       1. T2DM  -Suboptimal control, limited monitoring, some patient non compliance, inconsistency  -Here for patterning.   -Lantus 20 qhs, Novolog 24/24/24. SSI prn. Will determine insulin needs and adjust accordingly.   -Pricila Schwartz RN has seen pt this am and synced Dexcom and did DE. PDC will follow FSBS weekly from dc.     2. Reg diet  -changed from CC bc pt reports she will likely be unable to follow CC diet at home    3. SCD    4. FWB reassuring  -normal growth  -NST TID      Lore Hankins MD  12/11/2024  08:45 EST      Electronically signed by Lore Hankins MD at 12/11/24 1101       Consult Notes (last 72 hours)  Notes from 12/09/24 1437 through 12/12/24 1437   No notes of this type exist for this encounter.          Discharge Summary        Mickie Noland MD at 12/12/24 0858          [facility]   Discharge Summary      Patient: Henna  Crys Campuzano      MR#:[id number]  Admission  Diagnosis:   Poorly controlled T2DM in pregnancy    Discharge Diagnosis: same    Date of Admission: 12/10/2024  Date of Discharge:  12/12/2024    Procedures:  none    Service:  Obstetrics    Hospital Course:  Patient admitted for diabetic patterning.  Insulin regimen adjusted by MFM.  Normal US at [facility].  During admission she remained afebrile and hemodynamically stable.  On the day of discharge, she was eating, ambulating and voiding without difficulty.      Labs  Lab Results   Component Value Date    WBC 15.71 (H) 12/10/2024    HGB 11.8 (L) 12/10/2024    HCT 34.8 12/10/2024    MCV 91.6 12/10/2024     12/10/2024    POCGLU 109 12/12/2024    CREATININE 0.54 (L) 12/10/2024    URICACID 3.2 08/05/2024    AST 8 12/10/2024    ALT 7 12/10/2024     08/05/2024           Discharge Medications     Discharge Medications        New Medications        Instructions Start Date   insulin glargine 100 UNIT/ML injection  Commonly known as: LANTUS, SEMGLEE   22 Units, Subcutaneous, Nightly      Insulin Lispro 100 UNIT/ML injection  Commonly known as: humaLOG  Replaces: Insulin Lispro (1 Unit Dial) 100 UNIT/ML solution pen-injector   28 Units, Subcutaneous, 3 Times Daily With Meals             Continue These Medications        Instructions Start Date   acetone (urine) test strip   1 strip, Not Applicable, Daily      aspirin 81 MG EC tablet   81 mg, Daily      Dexcom G7 Sensor misc   1 each, Not Applicable, Every 10 Days      Insulin Pen Needle 32G X 4 MM misc   1 each, Not Applicable, 4 Times Daily      NIFEdipine XL 30 MG 24 hr tablet  Commonly known as: PROCARDIA XL   30 mg, Daily      ONE TOUCH ULTRA 2 w/Device kit   See Admin Instructions      OneTouch Verio test strip  Generic drug: glucose blood   See Admin Instructions      prenatal vitamin 27-0.8 27-0.8 MG tablet tablet   1 tablet, Daily             Stop These Medications      Insulin Glargine 100 UNIT/ML injection  pen  Commonly known as: LANTUS SOLOSTAR     Insulin Lispro (1 Unit Dial) 100 UNIT/ML solution pen-injector  Commonly known as: HumaLOG KwikPen  Replaced by: Insulin Lispro 100 UNIT/ML injection              Discharge Disposition:  To Home    Discharge Condition:  Stable    Discharge Diet: carb consistent    Activity at Discharge: as tolerated    Follow-up Appointments  1 week    Mickie Noland MD  12/12/24  08:57 EST             Electronically signed by Mickie Noland MD at 12/12/24 0858

## 2024-12-18 ENCOUNTER — OFFICE VISIT (OUTPATIENT)
Dept: OBSTETRICS AND GYNECOLOGY | Facility: HOSPITAL | Age: 34
End: 2024-12-18
Payer: COMMERCIAL

## 2024-12-18 ENCOUNTER — HOSPITAL ENCOUNTER (OUTPATIENT)
Dept: WOMENS IMAGING | Facility: HOSPITAL | Age: 34
Discharge: HOME | End: 2024-12-18
Admitting: OBSTETRICS & GYNECOLOGY
Payer: COMMERCIAL

## 2024-12-18 ENCOUNTER — MEDICATION THERAPY MANAGEMENT (OUTPATIENT)
Dept: OBSTETRICS AND GYNECOLOGY | Facility: HOSPITAL | Age: 34
End: 2024-12-18

## 2024-12-18 VITALS — SYSTOLIC BLOOD PRESSURE: 140 MMHG | DIASTOLIC BLOOD PRESSURE: 70 MMHG | BODY MASS INDEX: 44.55 KG/M2 | WEIGHT: 293 LBS

## 2024-12-18 DIAGNOSIS — O24.119 TYPE 2 DIABETES MELLITUS AFFECTING PREGNANCY, ANTEPARTUM: ICD-10-CM

## 2024-12-18 DIAGNOSIS — O09.299 HISTORY OF PRE-ECLAMPSIA IN PRIOR PREGNANCY, CURRENTLY PREGNANT: ICD-10-CM

## 2024-12-18 DIAGNOSIS — Z3A.24 24 WEEKS GESTATION OF PREGNANCY: ICD-10-CM

## 2024-12-18 DIAGNOSIS — N96 RECURRENT PREGNANCY LOSS: ICD-10-CM

## 2024-12-18 DIAGNOSIS — O24.119 TYPE 2 DIABETES MELLITUS AFFECTING PREGNANCY, ANTEPARTUM: Primary | ICD-10-CM

## 2024-12-18 DIAGNOSIS — E66.01 MORBID OBESITY WITH BMI OF 40.0-44.9, ADULT: ICD-10-CM

## 2024-12-18 DIAGNOSIS — O09.529 ANTEPARTUM MULTIGRAVIDA OF ADVANCED MATERNAL AGE: ICD-10-CM

## 2024-12-18 DIAGNOSIS — O10.919 CHRONIC HYPERTENSION AFFECTING PREGNANCY: ICD-10-CM

## 2024-12-18 PROCEDURE — 76819 FETAL BIOPHYS PROFIL W/O NST: CPT

## 2024-12-18 RX ORDER — INSULIN LISPRO 100 [IU]/ML
INJECTION, SOLUTION INTRAVENOUS; SUBCUTANEOUS
Qty: 2520 UNITS | Refills: 0 | Status: SHIPPED | OUTPATIENT
Start: 2024-12-18 | End: 2024-12-23

## 2024-12-18 NOTE — ASSESSMENT & PLAN NOTE
Patient was previously prescribed Procardia 30 mg daily but is currently not taking.  First blood pressure 140/70 and on repeat was 131/70.  Patient on aspirin for preeclampsia prevention.

## 2024-12-18 NOTE — PROGRESS NOTES
"    Maternal/Fetal Medicine Consult Note   Date: 2024  Name: Henna Campuzano    : 1990     MRN: [id number]     Referring Provider: Edna Muller DO    Chief Complaint  T2DM; CHTN; Recurrent preg. loss; MO; Hx PTD-34wk w/preecla    Subjective     History of Present Illness:  Henna Campuzano is a 34 y.o.  29w0d who presents today for Diabetes and HTN    XIOMARA: Estimated Date of Delivery: 3/5/25     ROS:   Otherwise Noted in HPI      Current Outpatient Medications:     acetone, urine, test strip, Use 1 strip Daily., Disp: 50 strip, Rfl: 3    aspirin 81 MG EC tablet, Take 1 tablet by mouth Daily., Disp: , Rfl:     Blood Glucose Monitoring Suppl (ONE TOUCH ULTRA 2) w/Device kit, See Admin Instructions., Disp: , Rfl:     Continuous Glucose Sensor (Dexcom G7 Sensor) misc, Use 1 each Every 10 (Ten) Days., Disp: 9 each, Rfl: 3    insulin glargine (LANTUS, SEMGLEE) 100 UNIT/ML injection, Inject 22 Units under the skin into the appropriate area as directed Every Night for 30 days., Disp: 6.6 mL, Rfl: 0    Insulin Lispro (humaLOG) 100 UNIT/ML injection, Inject 28 Units under the skin into the appropriate area as directed 3 (Three) Times a Day With Meals for 30 days., Disp: 2520 Units, Rfl: 0    Insulin Pen Needle 32G X 4 MM misc, Use 1 each 4 (Four) Times a Day., Disp: 400 each, Rfl: 3    OneTouch Verio test strip, See Admin Instructions., Disp: , Rfl:     Prenatal Vit-Fe Fumarate-FA (prenatal vitamin 27-0.8) 27-0.8 MG tablet tablet, Take 1 tablet by mouth Daily., Disp: , Rfl:     NIFEdipine XL (PROCARDIA XL) 30 MG 24 hr tablet, Take 1 tablet by mouth Daily. (Patient not taking: Reported on 2024), Disp: , Rfl:     Objective     Vital Signs  /70   Wt 133 kg (293 lb)   Estimated body mass index is 44.55 kg/m² as calculated from the following:    Height as of 12/10/24: 172.7 cm (68\").    Weight as of this encounter: 133 kg (293 lb).    Ultrasound Impression:   See Viewpoint "     Assessment and Plan     Henna Campuzano is a 34 y.o.  29w0d who presents today for Diabetes and HTN     Diagnoses and all orders for this visit:    1. Type 2 diabetes mellitus affecting pregnancy, antepartum (Primary)  Assessment & Plan:  Patient reports for follow-up ultrasound secondary to diabetes.  Diabetes is currently being managed by our office.  Currently on long-acting insulin 28 units at night and 22 units of short acting with meals.  Patient still with elevated fasting values and elevated postprandial with lunch and dinner.  Will change long-acting insulin to 24 units at night and while change of short acting insulin to 32 units with lunch and dinner.  Today's ultrasound shows normal TEJAS with incidental BPP is 8 out of 8.  Follow-up in 3 weeks for formal growth ultrasound.      2. Chronic hypertension affecting pregnancy  Assessment & Plan:  Patient was previously prescribed Procardia 30 mg daily but is currently not taking.  First blood pressure 140/70 and on repeat was 131/70.  Patient on aspirin for preeclampsia prevention.           Follow Up  3 weeks    I spent 10 minutes caring for the patient on the day of service. This included: obtaining or reviewing a separately obtained medical history, reviewing patient records, performing a medically appropriate exam and/or evaluation, counseling or educating the patient/family/caregiver, ordering medications, labs, and/or procedures and documenting such in the medical record. This does not include time spent on review and interpretation of other tests such as fetal ultrasound or the performance of other procedures such as amniocentesis or CVS.      Doug Ramos MD, FACOG  Maternal Fetal Medicine, [facility] Diagnostic [place]

## 2024-12-18 NOTE — ASSESSMENT & PLAN NOTE
Patient reports for follow-up ultrasound secondary to diabetes.  Diabetes is currently being managed by our office.  Currently on long-acting insulin 28 units at night and 22 units of short acting with meals.  Patient still with elevated fasting values and elevated postprandial with lunch and dinner.  Will change long-acting insulin to 24 units at night and while change of short acting insulin to 32 units with lunch and dinner.  Today's ultrasound shows normal TEJAS with incidental BPP is 8 out of 8.  Follow-up in 3 weeks for formal growth ultrasound.

## 2024-12-18 NOTE — PROGRESS NOTES
Patient denies any leaking fluid, vaginal bleeding, or contractions.States having movements  NIPT negative.  Patient reports next follow-up appointment with Dr. Muller' office is 12/23.

## 2024-12-18 NOTE — LETTER
2024     Edna Muller DO  [address]  [place] [address]    Patient: Henna Campuzano   YOB: 1990   Date of Visit: 2024       Dear Edna Muller DO,    Thank you for referring Henna Campuzano to me for evaluation. Below is a copy of my consult note.    If you have questions, please do not hesitate to call me. I look forward to following Henna along with you.         Sincerely,        Doug Ramos MD        CC: No Recipients    Patient denies any leaking fluid, vaginal bleeding, or contractions.States having movements  NIPT negative.  Patient reports next follow-up appointment with Dr. Muller' office is .          Maternal/Fetal Medicine Consult Note   Date: 2024  Name: Henna Campuzano    : 1990     MRN: [id number]     Referring Provider: Edna Muller DO    Chief Complaint  T2DM; CHTN; Recurrent preg. loss; MO; Hx PTD-34wk w/preecla    Subjective     History of Present Illness:  Henna Campuzano is a 34 y.o.  29w0d who presents today for Diabetes and HTN    XIOMARA: Estimated Date of Delivery: 3/5/25     ROS:   Otherwise Noted in HPI      Current Outpatient Medications:   •  acetone, urine, test strip, Use 1 strip Daily., Disp: 50 strip, Rfl: 3  •  aspirin 81 MG EC tablet, Take 1 tablet by mouth Daily., Disp: , Rfl:   •  Blood Glucose Monitoring Suppl (ONE TOUCH ULTRA 2) w/Device kit, See Admin Instructions., Disp: , Rfl:   •  Continuous Glucose Sensor (Dexcom G7 Sensor) misc, Use 1 each Every 10 (Ten) Days., Disp: 9 each, Rfl: 3  •  insulin glargine (LANTUS, SEMGLEE) 100 UNIT/ML injection, Inject 22 Units under the skin into the appropriate area as directed Every Night for 30 days., Disp: 6.6 mL, Rfl: 0  •  Insulin Lispro (humaLOG) 100 UNIT/ML injection, Inject 28 Units under the skin into the appropriate area as directed 3 (Three) Times a Day With Meals for 30 days., Disp: 2520 Units, Rfl: 0  •   "Insulin Pen Needle 32G X 4 MM misc, Use 1 each 4 (Four) Times a Day., Disp: 400 each, Rfl: 3  •  OneTouch Verio test strip, See Admin Instructions., Disp: , Rfl:   •  Prenatal Vit-Fe Fumarate-FA (prenatal vitamin 27-0.8) 27-0.8 MG tablet tablet, Take 1 tablet by mouth Daily., Disp: , Rfl:   •  NIFEdipine XL (PROCARDIA XL) 30 MG 24 hr tablet, Take 1 tablet by mouth Daily. (Patient not taking: Reported on 2024), Disp: , Rfl:     Objective     Vital Signs  /70   Wt 133 kg (293 lb)   Estimated body mass index is 44.55 kg/m² as calculated from the following:    Height as of 12/10/24: 172.7 cm (68\").    Weight as of this encounter: 133 kg (293 lb).    Ultrasound Impression:   See Viewpoint     Assessment and Plan     Henna Campuzano is a 34 y.o.  29w0d who presents today for Diabetes and HTN     Diagnoses and all orders for this visit:    1. Type 2 diabetes mellitus affecting pregnancy, antepartum (Primary)  Assessment & Plan:  Patient reports for follow-up ultrasound secondary to diabetes.  Diabetes is currently being managed by our office.  Currently on long-acting insulin 28 units at night and 22 units of short acting with meals.  Patient still with elevated fasting values and elevated postprandial with lunch and dinner.  Will change long-acting insulin to 24 units at night and while change of short acting insulin to 32 units with lunch and dinner.  Today's ultrasound shows normal TEJAS with incidental BPP is 8 out of 8.  Follow-up in 3 weeks for formal growth ultrasound.      2. Chronic hypertension affecting pregnancy  Assessment & Plan:  Patient was previously prescribed Procardia 30 mg daily but is currently not taking.  First blood pressure 140/70 and on repeat was 131/70.  Patient on aspirin for preeclampsia prevention.           Follow Up  3 weeks    I spent 10 minutes caring for the patient on the day of service. This included: obtaining or reviewing a separately obtained medical " history, reviewing patient records, performing a medically appropriate exam and/or evaluation, counseling or educating the patient/family/caregiver, ordering medications, labs, and/or procedures and documenting such in the medical record. This does not include time spent on review and interpretation of other tests such as fetal ultrasound or the performance of other procedures such as amniocentesis or CVS.      Doug Ramos MD, FACOG  Maternal Fetal Medicine, [facility] [place]

## 2024-12-23 ENCOUNTER — MEDICATION THERAPY MANAGEMENT (OUTPATIENT)
Dept: OBSTETRICS AND GYNECOLOGY | Facility: HOSPITAL | Age: 34
End: 2024-12-23
Payer: COMMERCIAL

## 2024-12-23 DIAGNOSIS — O24.119 TYPE 2 DIABETES MELLITUS AFFECTING PREGNANCY, ANTEPARTUM: ICD-10-CM

## 2024-12-23 RX ORDER — INSULIN LISPRO 100 [IU]/ML
INJECTION, SOLUTION INTRAVENOUS; SUBCUTANEOUS
Qty: 2520 UNITS | Refills: 0 | Status: SHIPPED | OUTPATIENT
Start: 2024-12-23

## 2025-01-02 ENCOUNTER — MEDICATION THERAPY MANAGEMENT (OUTPATIENT)
Dept: OBSTETRICS AND GYNECOLOGY | Facility: HOSPITAL | Age: 35
End: 2025-01-02
Payer: COMMERCIAL

## 2025-01-02 ENCOUNTER — TELEPHONE (OUTPATIENT)
Dept: OBSTETRICS AND GYNECOLOGY | Facility: HOSPITAL | Age: 35
End: 2025-01-02
Payer: COMMERCIAL

## 2025-01-02 DIAGNOSIS — O24.119 TYPE 2 DIABETES MELLITUS AFFECTING PREGNANCY, ANTEPARTUM: ICD-10-CM

## 2025-01-02 NOTE — TELEPHONE ENCOUNTER
Spoke with patient over the phone.  Informed patient that Dr. Decker has reviewed her blood sugar log and is changing her Semglee to 14 units twice a day.  Instructed to start the 14 units tonight, and that her readings will initially be higher but will balance out tomorrow.  She is to take 14 units 12 hours a part..  Patient is to continue her Lispro 28 u / 34 u /34 u.  Patient voices understanding.  Cara Schwartz RN

## 2025-01-07 ENCOUNTER — TELEPHONE (OUTPATIENT)
Dept: OBSTETRICS AND GYNECOLOGY | Facility: HOSPITAL | Age: 35
End: 2025-01-07
Payer: COMMERCIAL

## 2025-01-07 ENCOUNTER — MEDICATION THERAPY MANAGEMENT (OUTPATIENT)
Dept: OBSTETRICS AND GYNECOLOGY | Facility: HOSPITAL | Age: 35
End: 2025-01-07
Payer: COMMERCIAL

## 2025-01-07 DIAGNOSIS — O24.311 PREEXISTING DIABETES COMPLICATING PREGNANCY IN FIRST TRIMESTER, ANTEPARTUM: ICD-10-CM

## 2025-01-07 RX ORDER — ACYCLOVIR 400 MG/1
1 TABLET ORAL
Qty: 9 EACH | Refills: 3 | Status: SHIPPED | OUTPATIENT
Start: 2025-01-07

## 2025-01-07 NOTE — TELEPHONE ENCOUNTER
Received call from patient stating she is out of her dexcom sensors and the pharmacy informed her she was out of refills.  Patient states she is currently wearing her last sensor.  Informed patient would send in refills, confirmed her pharmacy is [facility].  Patient also inquired if high blood sugar could cause headaches unrelieved with tylenol.  Patient states yesterday her blood sugar was 300 and her head hurt so bad she was in tears.  Informed patient she should have gone to her local hospital to be evaluated.  Educated patient on when to be evaluated, what to look for regarding, LOF, contractions, high blood sugar, heacaches unrelived with tylenol.  Patient voices understanding.    Cara Schwartz RN

## 2025-01-08 ENCOUNTER — OFFICE VISIT (OUTPATIENT)
Dept: OBSTETRICS AND GYNECOLOGY | Facility: HOSPITAL | Age: 35
End: 2025-01-08
Payer: COMMERCIAL

## 2025-01-08 ENCOUNTER — MEDICATION THERAPY MANAGEMENT (OUTPATIENT)
Dept: OBSTETRICS AND GYNECOLOGY | Facility: HOSPITAL | Age: 35
End: 2025-01-08
Payer: COMMERCIAL

## 2025-01-08 ENCOUNTER — HOSPITAL ENCOUNTER (OUTPATIENT)
Dept: WOMENS IMAGING | Facility: HOSPITAL | Age: 35
Discharge: HOME | End: 2025-01-08
Admitting: OBSTETRICS & GYNECOLOGY
Payer: COMMERCIAL

## 2025-01-08 VITALS — SYSTOLIC BLOOD PRESSURE: 125 MMHG | WEIGHT: 291 LBS | BODY MASS INDEX: 44.25 KG/M2 | DIASTOLIC BLOOD PRESSURE: 69 MMHG

## 2025-01-08 DIAGNOSIS — O10.919 CHRONIC HYPERTENSION AFFECTING PREGNANCY: ICD-10-CM

## 2025-01-08 DIAGNOSIS — O24.119 TYPE 2 DIABETES MELLITUS AFFECTING PREGNANCY, ANTEPARTUM: Primary | ICD-10-CM

## 2025-01-08 DIAGNOSIS — O09.299 HISTORY OF PRE-ECLAMPSIA IN PRIOR PREGNANCY, CURRENTLY PREGNANT: ICD-10-CM

## 2025-01-08 DIAGNOSIS — O24.119 TYPE 2 DIABETES MELLITUS AFFECTING PREGNANCY, ANTEPARTUM: ICD-10-CM

## 2025-01-08 PROCEDURE — 76819 FETAL BIOPHYS PROFIL W/O NST: CPT

## 2025-01-08 PROCEDURE — 76816 OB US FOLLOW-UP PER FETUS: CPT

## 2025-01-08 RX ORDER — INSULIN LISPRO 100 [IU]/ML
INJECTION, SOLUTION INTRAVENOUS; SUBCUTANEOUS
Qty: 2520 UNITS | Refills: 0 | Status: SHIPPED | OUTPATIENT
Start: 2025-01-08

## 2025-01-08 NOTE — PROGRESS NOTES
Patient denies any leaking fluid, vaginal bleeding, or contractions. Patient states lower back pain, not relieved with position change.  NIPT low risk.  Patient reports next follow-up appointment with Dr. Muller' office is 1/9.  Patient provided instructions to increase Semglee to 18 units twice a day and increase Lispro to 32 units with breakfast and 40 units with lunch and dinner.

## 2025-01-08 NOTE — LETTER
"January 10, 2025     Edna Muller DO  [address] [facility] [address]  [facility] [address]    Patient: Henna Campuzano   YOB: 1990   Date of Visit: 2025       Dear Edna Muller DO,    Thank you for referring Henna Campuzano to me for evaluation. Below is a copy of my consult note.    If you have questions, please do not hesitate to call me. I look forward to following Henna along with you.         Sincerely,        Carmelina Bautista MD        CC: No Recipients    Patient denies any leaking fluid, vaginal bleeding, or contractions. Patient states lower back pain, not relieved with position change.  NIPT low risk.  Patient reports next follow-up appointment with Dr. Muller' office is .  Patient provided instructions to increase Semglee to 18 units twice a day and increase Lispro to 32 units with breakfast and 40 units with lunch and dinner.          Maternal/Fetal Medicine Follow Up Note     Name: Henna Campuzano    : 1990     MRN: [id number]     Referring Provider: Edna Muller DO    Chief Complaint  T2DM; MO; CHTN; AMA    Subjective     History of Present Illness:  Henna Campuzano is a 34 y.o.  32w2d who presents today for follow up in the setting of suboptimally controlled T2DM, CHTN and AMA.   Insulin increased today   Undergoing twice weekly  testing                 XIOMARA: Estimated Date of Delivery: 3/5/25     ROS:   As noted in HPI.     Objective     Vital Signs  /69   Wt 132 kg (291 lb)   Estimated body mass index is 44.25 kg/m² as calculated from the following:    Height as of 12/10/24: 172.7 cm (68\").    Weight as of this encounter: 132 kg (291 lb).    Ultrasound Impression:   See viewpoint      Assessment and Plan     Henna Campuzano is a 34 y.o.  32w2d     Diagnoses and all orders for this visit:    1. Type 2 diabetes mellitus affecting pregnancy, antepartum (Primary)  Assessment & " Plan:  Glucoses and medications being managed by [facility] [place]   BP well controlled today   Continue twice weekly  testing in your office   Follow up 4 weeks for repeat growth   Planned delivery at 37 weeks is appropriate     Orders:  -     [facility]  Diagnostic Center; Future    2. Chronic hypertension affecting pregnancy  -     [facility]  Diagnostic Center; Future    3. History of pre-eclampsia in prior pregnancy, currently pregnant         Follow Up  4 weeks     I spent 10 minutes caring for the patient on the day of service. This included: obtaining or reviewing a separately obtained medical history, reviewing patient records, performing a medically appropriate exam and/or evaluation, counseling or educating the patient/family/caregiver, ordering medications, labs, and/or procedures and documenting such in the medical record. This does not include time spent on review and interpretation of other tests such as fetal ultrasound or the performance of other procedures such as amniocentesis or CVS.    Carmelina Bautista MD FACOG  Maternal Fetal Medicine, [facility]     2025

## 2025-01-10 NOTE — ASSESSMENT & PLAN NOTE
Glucoses and medications being managed by [facility]   BP well controlled today   Continue twice weekly  testing in your office   Follow up 4 weeks for repeat growth   Planned delivery at 37 weeks is appropriate

## 2025-01-10 NOTE — PROGRESS NOTES
"    Maternal/Fetal Medicine Follow Up Note     Name: Henna Campuzano    : 1990     MRN: [id number]     Referring Provider: Edna Muller DO    Chief Complaint  T2DM; MO; CHTN; AMA    Subjective     History of Present Illness:  Henna Campuzano is a 34 y.o.  32w2d who presents today for follow up in the setting of suboptimally controlled T2DM, CHTN and AMA.   Insulin increased today   Undergoing twice weekly  testing                 XIOMARA: Estimated Date of Delivery: 3/5/25     ROS:   As noted in HPI.     Objective     Vital Signs  /69   Wt 132 kg (291 lb)   Estimated body mass index is 44.25 kg/m² as calculated from the following:    Height as of 12/10/24: 172.7 cm (68\").    Weight as of this encounter: 132 kg (291 lb).    Ultrasound Impression:   See viewpoint      Assessment and Plan     Henna Campuzano is a 34 y.o.  32w2d     Diagnoses and all orders for this visit:    1. Type 2 diabetes mellitus affecting pregnancy, antepartum (Primary)  Assessment & Plan:  Glucoses and medications being managed by [facility]   BP well controlled today   Continue twice weekly  testing in your office   Follow up 4 weeks for repeat growth   Planned delivery at 37 weeks is appropriate     Orders:  -     [facility]  Diagnostic Center; Future    2. Chronic hypertension affecting pregnancy  -     [facility]  Diagnostic Center; Future    3. History of pre-eclampsia in prior pregnancy, currently pregnant         Follow Up  4 weeks     I spent 10 minutes caring for the patient on the day of service. This included: obtaining or reviewing a separately obtained medical history, reviewing patient records, performing a medically appropriate exam and/or evaluation, counseling or educating the patient/family/caregiver, ordering medications, labs, and/or procedures and documenting such in the medical record. This does not include time spent on review and " interpretation of other tests such as fetal ultrasound or the performance of other procedures such as amniocentesis or CVS.    Carmelina Bautista MD FACOG  Maternal Fetal Medicine, [facility]     2025

## 2025-01-15 ENCOUNTER — TELEPHONE (OUTPATIENT)
Dept: OBSTETRICS AND GYNECOLOGY | Facility: HOSPITAL | Age: 35
End: 2025-01-15
Payer: COMMERCIAL

## 2025-01-15 ENCOUNTER — MEDICATION THERAPY MANAGEMENT (OUTPATIENT)
Dept: OBSTETRICS AND GYNECOLOGY | Facility: HOSPITAL | Age: 35
End: 2025-01-15
Payer: COMMERCIAL

## 2025-01-15 DIAGNOSIS — O24.119 TYPE 2 DIABETES MELLITUS AFFECTING PREGNANCY, ANTEPARTUM: ICD-10-CM

## 2025-01-15 RX ORDER — INSULIN LISPRO 100 [IU]/ML
INJECTION, SOLUTION INTRAVENOUS; SUBCUTANEOUS
Qty: 2520 UNITS | Refills: 0 | Status: SHIPPED | OUTPATIENT
Start: 2025-01-15

## 2025-01-15 NOTE — TELEPHONE ENCOUNTER
Spoke with patient over the phone.  Informed patient that DR. Decker has reviewed her blood sugar log and is increasing her semglee to 24 units twice a day and increasing her Lispro to 38 units with breakfast , 48 units with lunch and 48 units with dinner.  Patient voices understanding and denies needs  at this time.  Cara Schwartz RN

## 2025-01-23 ENCOUNTER — MEDICATION THERAPY MANAGEMENT (OUTPATIENT)
Dept: OBSTETRICS AND GYNECOLOGY | Facility: HOSPITAL | Age: 35
End: 2025-01-23
Payer: COMMERCIAL

## 2025-01-23 DIAGNOSIS — O24.119 TYPE 2 DIABETES MELLITUS AFFECTING PREGNANCY, ANTEPARTUM: ICD-10-CM

## 2025-01-23 RX ORDER — INSULIN LISPRO 100 [IU]/ML
INJECTION, SOLUTION INTRAVENOUS; SUBCUTANEOUS
Qty: 2520 UNITS | Refills: 0 | Status: SHIPPED | OUTPATIENT
Start: 2025-01-23

## 2025-01-29 ENCOUNTER — TELEPHONE (OUTPATIENT)
Dept: OBSTETRICS AND GYNECOLOGY | Facility: HOSPITAL | Age: 35
End: 2025-01-29
Payer: COMMERCIAL

## 2025-01-29 ENCOUNTER — MEDICATION THERAPY MANAGEMENT (OUTPATIENT)
Dept: OBSTETRICS AND GYNECOLOGY | Facility: HOSPITAL | Age: 35
End: 2025-01-29
Payer: COMMERCIAL

## 2025-01-29 DIAGNOSIS — O24.119 TYPE 2 DIABETES MELLITUS AFFECTING PREGNANCY, ANTEPARTUM: ICD-10-CM

## 2025-01-29 RX ORDER — INSULIN LISPRO 100 [IU]/ML
INJECTION, SOLUTION INTRAVENOUS; SUBCUTANEOUS
Qty: 2520 UNITS | Refills: 0 | Status: SHIPPED | OUTPATIENT
Start: 2025-01-29

## 2025-01-29 NOTE — TELEPHONE ENCOUNTER
Received call from patient regarding her dexcom.  Patient states she has had 2 sensors pull out of her arm and she is out of sensors.  Instructed patient to call dexcom technical support, they may replace one of them.  Explained that pharmacies do not replace sensors.  Patient states when she had her last sensors filled pharmacy had written on her label they owed her a sensor.  Instructed patient to call the pharmacy and see if they will give her another sensor today since they owe her one.  If not instructed patient to do fingersticks and keep a log until she can either get a sensor from dexcom or get her sensors refilled.  Also informed patient that Dr. Decker has reviewed her blood sugar log and is increasing her Lispro to 50 units with breakfast, 66 units with lunch and 66 units with dinner.  Patient voices understanding.  Asked if patient has a scheduled delivery date.  Patient states she is scheduled for induction of labor on 2/11/25 at 8:00 pm.  Cara Schwartz RN

## 2025-02-05 ENCOUNTER — HOSPITAL ENCOUNTER (OUTPATIENT)
Dept: WOMENS IMAGING | Facility: HOSPITAL | Age: 35
Discharge: HOME | End: 2025-02-05
Admitting: OBSTETRICS & GYNECOLOGY
Payer: COMMERCIAL

## 2025-02-05 ENCOUNTER — OFFICE VISIT (OUTPATIENT)
Dept: OBSTETRICS AND GYNECOLOGY | Facility: HOSPITAL | Age: 35
End: 2025-02-05
Payer: COMMERCIAL

## 2025-02-05 VITALS — WEIGHT: 293 LBS | BODY MASS INDEX: 45.4 KG/M2 | SYSTOLIC BLOOD PRESSURE: 159 MMHG | DIASTOLIC BLOOD PRESSURE: 79 MMHG

## 2025-02-05 DIAGNOSIS — E66.01 MORBID OBESITY WITH BMI OF 40.0-44.9, ADULT: ICD-10-CM

## 2025-02-05 DIAGNOSIS — O24.113 TYPE 2 DIABETES MELLITUS AFFECTING PREGNANCY IN THIRD TRIMESTER, ANTEPARTUM: ICD-10-CM

## 2025-02-05 DIAGNOSIS — O09.529 ANTEPARTUM MULTIGRAVIDA OF ADVANCED MATERNAL AGE: Primary | ICD-10-CM

## 2025-02-05 DIAGNOSIS — O10.919 CHRONIC HYPERTENSION AFFECTING PREGNANCY: ICD-10-CM

## 2025-02-05 DIAGNOSIS — Z3A.36 36 WEEKS GESTATION OF PREGNANCY: ICD-10-CM

## 2025-02-05 DIAGNOSIS — O24.119 TYPE 2 DIABETES MELLITUS AFFECTING PREGNANCY, ANTEPARTUM: ICD-10-CM

## 2025-02-05 PROCEDURE — 76819 FETAL BIOPHYS PROFIL W/O NST: CPT

## 2025-02-05 PROCEDURE — 76816 OB US FOLLOW-UP PER FETUS: CPT

## 2025-02-05 RX ORDER — ACETAMINOPHEN 500 MG
500 TABLET ORAL EVERY 6 HOURS PRN
COMMUNITY

## 2025-02-05 NOTE — PROGRESS NOTES
Patient denies vaginal bleeding. She reports contractions every 20-30 minutes. She also reports increased moisture/possibly some leaking of fluid.  NIPT low risk  Patient reports next follow-up appointment with Dr. Muller' office is 2/7.

## 2025-02-06 ENCOUNTER — TELEPHONE (OUTPATIENT)
Dept: OBSTETRICS AND GYNECOLOGY | Facility: HOSPITAL | Age: 35
End: 2025-02-06
Payer: COMMERCIAL

## 2025-02-06 ENCOUNTER — HOSPITAL ENCOUNTER (OUTPATIENT)
Facility: HOSPITAL | Age: 35
Setting detail: OBSERVATION
Discharge: HOME | End: 2025-02-07
Attending: OBSTETRICS & GYNECOLOGY | Admitting: OBSTETRICS & GYNECOLOGY
Payer: COMMERCIAL

## 2025-02-06 PROCEDURE — G0463 HOSPITAL OUTPT CLINIC VISIT: HCPCS

## 2025-02-06 NOTE — TELEPHONE ENCOUNTER
Spoke with patient over the phone.  Informed patient that Dr. Decker has reviewed her blood sugar log and is not making any changes.  Her delivery plan fpr insulin for induction is to NOT take Semglee the night of induction.  Morning in hospital would take 20 units.  After delivery, would try 20 units Lispro with meals.  Patient voices understanding.  Cara Schwartz RN

## 2025-02-07 VITALS
SYSTOLIC BLOOD PRESSURE: 137 MMHG | TEMPERATURE: 98.5 F | HEART RATE: 112 BPM | RESPIRATION RATE: 18 BRPM | HEIGHT: 68 IN | DIASTOLIC BLOOD PRESSURE: 89 MMHG | OXYGEN SATURATION: 98 % | BODY MASS INDEX: 45.4 KG/M2

## 2025-02-07 PROBLEM — O47.03 FALSE LABOR BEFORE 37 COMPLETED WEEKS OF GESTATION DURING PREGNANCY IN THIRD TRIMESTER, ANTEPARTUM: Status: ACTIVE | Noted: 2025-02-07

## 2025-02-07 PROCEDURE — 59025 FETAL NON-STRESS TEST: CPT

## 2025-02-07 NOTE — H&P
"Henna Campuzano  1990  [id number]  [id number]    CC: contractions  HPI:  Patient is 34 y.o. female   currently at 36w2d presents with c/o contractions, onset ~1 weeks ago, worse starting yesterday.  Pt denies assoc bleeding or leaking.  Good FM.  PNC comp by IDDM, chron HTN (currently off meds), tob use, adv mat age, and BMI 45.    PMH:  Current meds: insulin (long acting) 28u bid, insulin (short acting 50u (breakfast),    66u (lunch and supper)), PNV, baby ASA, Tyl prn  Illnesses: PCOS, endometriosis (not confirmed laparascopically), HTN (no meds),   diabetes  Surgeries: lap cesario, ear surg X 2, T and A, D and C X 2  Allergies: bactrim- rash, swelling    Past OB History:       OB History    Para Term  AB Living   11 1 0 1 9 1   SAB IAB Ectopic Molar Multiple Live Births   9 0 0 0 0 1      # Outcome Date GA Lbr Kevan/2nd Weight Sex Type Anes PTL Lv   11 Current            10 2016 6w0d    SAB      9 SAB 2015 12w0d    SAB      8 2014 6w0d    SAB      7 2013 6w0d    SAB      6 SAB 2013 6w0d    SAB      5 SAB 2011 8w0d    SAB      4 SAB 2011 6w0d    SAB      3 SAB  6w0d    SAB      2 SAB  6w0d    SAB      1  08 34w0d  2948 g (6 lb 8 oz) M Vag-Vacuum EPI  FERNANDO      Birth Comments: preeclampsia; GDM      Obstetric Comments   FOB #1 Pregnancy #1, 2, 3   FOB #2 Pregnancy #4 SAB, D&C, #5-10   FOB #3 Pregnancy #1  current   9 miscarriages            SH: tob 2-3/d , EtOH neg, drugs neg      General ROS: contractions.   All other systems reviewed and are negative.      Physical Examination: General appearance - alert, well appearing, and in no distress  Vital signs - /89 (BP Location: Right arm, Patient Position: Lying)   Pulse 112   Temp 98.5 °F (36.9 °C) (Oral)   Resp 18   Ht 172.7 cm (68\")   SpO2 98%   BMI 45.40 kg/m²   HEENT: normocephalic, atraumatic,oropharynx clear, appearance of ears and nose normal  Neck - supple, no " significant adenopathy, no thyromegaly  Lymphatics - no palpable lymphadenopathy in the neck or groin, no hepatosplenomegaly  Chest - clear to auscultation, no wheezes, rales or rhonchi, respiratory effort non-labored  Heart - normal rate, regular rhythm, no murmurs, rubs, clicks or gallops, no JVD, no lower extremity edema  Abdomen - soft, nontender, nondistended, no masses, no hepatosplenomegaly  no rebound tenderness noted, bowel sounds normal  Vaginal Exam: 1-2/50%/-3, no blood in vault,external genitalia normal  Extremities - no pedal edema noted, no calf tenderness  Skin -warm and dry, normal coloration and turgor, no rashes, no suspicious skin lesions noted    Fetal monitoring: indication contractions, onset 2344, offset 0024, baseline 145-150, mod BTB variability, multiple accels (15 X 15), no decels, irreg contractions, interpretation reactive NST    Radiology     Assessment 1)IUP 36 2/7 weeks   2)false labor   3)IDDM   4)chron HTN   5)tob use   6)adv mat age   7)BMI 45    Plan 1)observe   2)home   3)keep next sched appt    Abdon Kline MD  2/7/2025  00:28 EST

## 2025-02-10 ENCOUNTER — PREP FOR SURGERY (OUTPATIENT)
Dept: OTHER | Facility: HOSPITAL | Age: 35
End: 2025-02-10
Payer: COMMERCIAL

## 2025-02-10 DIAGNOSIS — O24.113 TYPE 2 DIABETES MELLITUS AFFECTING PREGNANCY IN THIRD TRIMESTER, ANTEPARTUM: Primary | ICD-10-CM

## 2025-02-10 RX ORDER — SODIUM CHLORIDE 0.9 % (FLUSH) 0.9 %
10 SYRINGE (ML) INJECTION AS NEEDED
Status: CANCELLED | OUTPATIENT
Start: 2025-02-10

## 2025-02-10 RX ORDER — OXYTOCIN/0.9 % SODIUM CHLORIDE 30/500 ML
2-20 PLASTIC BAG, INJECTION (ML) INTRAVENOUS
Status: CANCELLED | OUTPATIENT
Start: 2025-02-10

## 2025-02-10 RX ORDER — NICOTINE POLACRILEX 4 MG
15 LOZENGE BUCCAL
Status: CANCELLED | OUTPATIENT
Start: 2025-02-10

## 2025-02-10 RX ORDER — OXYTOCIN/0.9 % SODIUM CHLORIDE 30/500 ML
30 PLASTIC BAG, INJECTION (ML) INTRAVENOUS CONTINUOUS
Status: CANCELLED | OUTPATIENT
Start: 2025-02-10 | End: 2025-02-10

## 2025-02-10 RX ORDER — DEXTROSE MONOHYDRATE 25 G/50ML
25 INJECTION, SOLUTION INTRAVENOUS
Status: CANCELLED | OUTPATIENT
Start: 2025-02-10

## 2025-02-10 RX ORDER — BUTORPHANOL TARTRATE 1 MG/ML
1 INJECTION, SOLUTION INTRAMUSCULAR; INTRAVENOUS
Status: CANCELLED | OUTPATIENT
Start: 2025-02-10

## 2025-02-10 RX ORDER — MORPHINE SULFATE 2 MG/ML
2 INJECTION, SOLUTION INTRAMUSCULAR; INTRAVENOUS
Status: CANCELLED | OUTPATIENT
Start: 2025-02-10 | End: 2025-02-15

## 2025-02-10 RX ORDER — ACETAMINOPHEN 325 MG/1
650 TABLET ORAL EVERY 4 HOURS PRN
Status: CANCELLED | OUTPATIENT
Start: 2025-02-10

## 2025-02-10 RX ORDER — TERBUTALINE SULFATE 1 MG/ML
0.25 INJECTION, SOLUTION SUBCUTANEOUS AS NEEDED
Status: CANCELLED | OUTPATIENT
Start: 2025-02-10

## 2025-02-10 RX ORDER — ONDANSETRON 4 MG/1
4 TABLET, ORALLY DISINTEGRATING ORAL EVERY 6 HOURS PRN
Status: CANCELLED | OUTPATIENT
Start: 2025-02-10

## 2025-02-10 RX ORDER — OXYTOCIN/0.9 % SODIUM CHLORIDE 30/500 ML
30 PLASTIC BAG, INJECTION (ML) INTRAVENOUS ONCE
Status: CANCELLED | OUTPATIENT
Start: 2025-02-10 | End: 2025-02-10

## 2025-02-10 RX ORDER — ONDANSETRON 2 MG/ML
4 INJECTION INTRAMUSCULAR; INTRAVENOUS EVERY 6 HOURS PRN
Status: CANCELLED | OUTPATIENT
Start: 2025-02-10

## 2025-02-10 RX ORDER — SODIUM CHLORIDE 9 MG/ML
40 INJECTION, SOLUTION INTRAVENOUS AS NEEDED
Status: CANCELLED | OUTPATIENT
Start: 2025-02-10

## 2025-02-10 RX ORDER — PROMETHAZINE HYDROCHLORIDE 12.5 MG/1
12.5 TABLET ORAL EVERY 6 HOURS PRN
Status: CANCELLED | OUTPATIENT
Start: 2025-02-10

## 2025-02-10 RX ORDER — METHYLERGONOVINE MALEATE 0.2 MG/ML
200 INJECTION INTRAVENOUS ONCE AS NEEDED
Status: CANCELLED | OUTPATIENT
Start: 2025-02-10

## 2025-02-10 RX ORDER — CARBOPROST TROMETHAMINE 250 UG/ML
250 INJECTION, SOLUTION INTRAMUSCULAR AS NEEDED
Status: CANCELLED | OUTPATIENT
Start: 2025-02-10

## 2025-02-10 RX ORDER — SODIUM CHLORIDE 0.9 % (FLUSH) 0.9 %
10 SYRINGE (ML) INJECTION EVERY 12 HOURS SCHEDULED
Status: CANCELLED | OUTPATIENT
Start: 2025-02-10

## 2025-02-10 RX ORDER — PROMETHAZINE HYDROCHLORIDE 12.5 MG/1
12.5 SUPPOSITORY RECTAL EVERY 6 HOURS PRN
Status: CANCELLED | OUTPATIENT
Start: 2025-02-10

## 2025-02-10 RX ORDER — MISOPROSTOL 200 UG/1
800 TABLET ORAL AS NEEDED
Status: CANCELLED | OUTPATIENT
Start: 2025-02-10

## 2025-02-10 RX ORDER — MAGNESIUM CARB/ALUMINUM HYDROX 105-160MG
30 TABLET,CHEWABLE ORAL ONCE
Status: CANCELLED | OUTPATIENT
Start: 2025-02-10 | End: 2025-02-10

## 2025-02-10 RX ORDER — LIDOCAINE HYDROCHLORIDE 10 MG/ML
0.5 INJECTION, SOLUTION EPIDURAL; INFILTRATION; INTRACAUDAL; PERINEURAL ONCE AS NEEDED
Status: CANCELLED | OUTPATIENT
Start: 2025-02-10

## 2025-02-10 RX ORDER — SODIUM CHLORIDE, SODIUM LACTATE, POTASSIUM CHLORIDE, CALCIUM CHLORIDE 600; 310; 30; 20 MG/100ML; MG/100ML; MG/100ML; MG/100ML
75 INJECTION, SOLUTION INTRAVENOUS CONTINUOUS
Status: CANCELLED | OUTPATIENT
Start: 2025-02-10 | End: 2025-02-12

## 2025-02-11 ENCOUNTER — HOSPITAL ENCOUNTER (INPATIENT)
Facility: HOSPITAL | Age: 35
LOS: 3 days | Discharge: HOME | End: 2025-02-14
Attending: OBSTETRICS & GYNECOLOGY | Admitting: OBSTETRICS & GYNECOLOGY
Payer: COMMERCIAL

## 2025-02-11 ENCOUNTER — HOSPITAL ENCOUNTER (OUTPATIENT)
Dept: LABOR AND DELIVERY | Facility: HOSPITAL | Age: 35
Discharge: HOME | End: 2025-02-11
Payer: COMMERCIAL

## 2025-02-11 DIAGNOSIS — O24.113 TYPE 2 DIABETES MELLITUS AFFECTING PREGNANCY IN THIRD TRIMESTER, ANTEPARTUM: ICD-10-CM

## 2025-02-11 DIAGNOSIS — O24.119 TYPE 2 DIABETES MELLITUS AFFECTING PREGNANCY, ANTEPARTUM: ICD-10-CM

## 2025-02-11 PROCEDURE — 86850 RBC ANTIBODY SCREEN: CPT | Performed by: OBSTETRICS & GYNECOLOGY

## 2025-02-11 PROCEDURE — 3E033VJ INTRODUCTION OF OTHER HORMONE INTO PERIPHERAL VEIN, PERCUTANEOUS APPROACH: ICD-10-PCS | Performed by: OBSTETRICS & GYNECOLOGY

## 2025-02-11 PROCEDURE — 86901 BLOOD TYPING SEROLOGIC RH(D): CPT | Performed by: OBSTETRICS & GYNECOLOGY

## 2025-02-11 PROCEDURE — 86900 BLOOD TYPING SEROLOGIC ABO: CPT | Performed by: OBSTETRICS & GYNECOLOGY

## 2025-02-11 RX ORDER — LIDOCAINE HYDROCHLORIDE 10 MG/ML
0.5 INJECTION, SOLUTION EPIDURAL; INFILTRATION; INTRACAUDAL; PERINEURAL ONCE AS NEEDED
Status: DISCONTINUED | OUTPATIENT
Start: 2025-02-11 | End: 2025-02-12

## 2025-02-11 RX ORDER — SODIUM CHLORIDE 9 MG/ML
40 INJECTION, SOLUTION INTRAVENOUS AS NEEDED
Status: DISCONTINUED | OUTPATIENT
Start: 2025-02-11 | End: 2025-02-12

## 2025-02-11 RX ORDER — MORPHINE SULFATE 2 MG/ML
2 INJECTION, SOLUTION INTRAMUSCULAR; INTRAVENOUS
Status: DISCONTINUED | OUTPATIENT
Start: 2025-02-11 | End: 2025-02-12

## 2025-02-11 RX ORDER — AMOXICILLIN 875 MG/1
875 TABLET, COATED ORAL 3 TIMES DAILY
COMMUNITY
End: 2025-02-14 | Stop reason: HOSPADM

## 2025-02-11 RX ORDER — ONDANSETRON 4 MG/1
4 TABLET, ORALLY DISINTEGRATING ORAL EVERY 6 HOURS PRN
Status: DISCONTINUED | OUTPATIENT
Start: 2025-02-11 | End: 2025-02-12

## 2025-02-11 RX ORDER — BUTORPHANOL TARTRATE 1 MG/ML
1 INJECTION, SOLUTION INTRAMUSCULAR; INTRAVENOUS
Status: DISCONTINUED | OUTPATIENT
Start: 2025-02-11 | End: 2025-02-12

## 2025-02-11 RX ORDER — TERBUTALINE SULFATE 1 MG/ML
0.25 INJECTION, SOLUTION SUBCUTANEOUS AS NEEDED
Status: DISCONTINUED | OUTPATIENT
Start: 2025-02-11 | End: 2025-02-12

## 2025-02-11 RX ORDER — SODIUM CHLORIDE, SODIUM LACTATE, POTASSIUM CHLORIDE, CALCIUM CHLORIDE 600; 310; 30; 20 MG/100ML; MG/100ML; MG/100ML; MG/100ML
75 INJECTION, SOLUTION INTRAVENOUS CONTINUOUS
Status: DISCONTINUED | OUTPATIENT
Start: 2025-02-11 | End: 2025-02-12

## 2025-02-11 RX ORDER — PROMETHAZINE HYDROCHLORIDE 12.5 MG/1
12.5 TABLET ORAL EVERY 6 HOURS PRN
Status: DISCONTINUED | OUTPATIENT
Start: 2025-02-11 | End: 2025-02-12

## 2025-02-11 RX ORDER — OXYTOCIN/0.9 % SODIUM CHLORIDE 30/500 ML
30 PLASTIC BAG, INJECTION (ML) INTRAVENOUS ONCE
Status: COMPLETED | OUTPATIENT
Start: 2025-02-11 | End: 2025-02-12

## 2025-02-11 RX ORDER — ACETAMINOPHEN 325 MG/1
650 TABLET ORAL EVERY 4 HOURS PRN
Status: DISCONTINUED | OUTPATIENT
Start: 2025-02-11 | End: 2025-02-12

## 2025-02-11 RX ORDER — SODIUM CHLORIDE 0.9 % (FLUSH) 0.9 %
10 SYRINGE (ML) INJECTION EVERY 12 HOURS SCHEDULED
Status: DISCONTINUED | OUTPATIENT
Start: 2025-02-11 | End: 2025-02-12

## 2025-02-11 RX ORDER — PROMETHAZINE HYDROCHLORIDE 12.5 MG/1
12.5 SUPPOSITORY RECTAL EVERY 6 HOURS PRN
Status: DISCONTINUED | OUTPATIENT
Start: 2025-02-11 | End: 2025-02-14 | Stop reason: HOSPADM

## 2025-02-11 RX ORDER — SODIUM CHLORIDE 0.9 % (FLUSH) 0.9 %
10 SYRINGE (ML) INJECTION AS NEEDED
Status: DISCONTINUED | OUTPATIENT
Start: 2025-02-11 | End: 2025-02-12

## 2025-02-11 RX ORDER — OXYTOCIN/0.9 % SODIUM CHLORIDE 30/500 ML
2-20 PLASTIC BAG, INJECTION (ML) INTRAVENOUS
Status: DISCONTINUED | OUTPATIENT
Start: 2025-02-11 | End: 2025-02-12

## 2025-02-11 RX ORDER — MAGNESIUM CARB/ALUMINUM HYDROX 105-160MG
30 TABLET,CHEWABLE ORAL ONCE
Status: DISCONTINUED | OUTPATIENT
Start: 2025-02-11 | End: 2025-02-12

## 2025-02-11 RX ORDER — ONDANSETRON 2 MG/ML
4 INJECTION INTRAMUSCULAR; INTRAVENOUS EVERY 6 HOURS PRN
Status: DISCONTINUED | OUTPATIENT
Start: 2025-02-11 | End: 2025-02-12

## 2025-02-11 RX ORDER — OXYTOCIN/0.9 % SODIUM CHLORIDE 30/500 ML
30 PLASTIC BAG, INJECTION (ML) INTRAVENOUS CONTINUOUS
Status: ACTIVE | OUTPATIENT
Start: 2025-02-11 | End: 2025-02-11

## 2025-02-12 ENCOUNTER — ANESTHESIA (OUTPATIENT)
Dept: LABOR AND DELIVERY | Facility: HOSPITAL | Age: 35
End: 2025-02-12
Payer: COMMERCIAL

## 2025-02-12 ENCOUNTER — ANESTHESIA EVENT (OUTPATIENT)
Dept: LABOR AND DELIVERY | Facility: HOSPITAL | Age: 35
End: 2025-02-12
Payer: COMMERCIAL

## 2025-02-12 LAB
ABO GROUP BLD: NORMAL
ALP SERPL-CCNC: 160 U/L (ref 39–117)
ALT SERPL W P-5'-P-CCNC: 9 U/L (ref 1–33)
AST SERPL-CCNC: 12 U/L (ref 1–32)
BILIRUB SERPL-MCNC: <0.2 MG/DL (ref 0–1.2)
BLD GP AB SCN SERPL QL: NEGATIVE
CREAT SERPL-MCNC: 0.51 MG/DL (ref 0.57–1)
DEPRECATED RDW RBC AUTO: 44.7 FL (ref 37–54)
ERYTHROCYTE [DISTWIDTH] IN BLOOD BY AUTOMATED COUNT: 13.6 % (ref 12.3–15.4)
GLUCOSE BLDC GLUCOMTR-MCNC: 105 MG/DL (ref 70–130)
GLUCOSE BLDC GLUCOMTR-MCNC: 150 MG/DL (ref 70–130)
GLUCOSE BLDC GLUCOMTR-MCNC: 97 MG/DL (ref 70–130)
GLUCOSE SERPL-MCNC: 86 MG/DL (ref 65–99)
HCT VFR BLD AUTO: 38.3 % (ref 34–46.6)
HGB BLD-MCNC: 13.1 G/DL (ref 12–15.9)
LDH SERPL-CCNC: 211 U/L (ref 135–214)
MCH RBC QN AUTO: 31 PG (ref 26.6–33)
MCHC RBC AUTO-ENTMCNC: 34.2 G/DL (ref 31.5–35.7)
MCV RBC AUTO: 90.5 FL (ref 79–97)
PLATELET # BLD AUTO: 200 10*3/MM3 (ref 140–450)
PMV BLD AUTO: 11.4 FL (ref 6–12)
RBC # BLD AUTO: 4.23 10*6/MM3 (ref 3.77–5.28)
RH BLD: POSITIVE
T&S EXPIRATION DATE: NORMAL
TREPONEMA PALLIDUM IGG+IGM AB [PRESENCE] IN SERUM OR PLASMA BY IMMUNOASSAY: NORMAL
URATE SERPL-MCNC: 4.2 MG/DL (ref 2.4–5.7)
WBC NRBC COR # BLD AUTO: 16.79 10*3/MM3 (ref 3.4–10.8)

## 2025-02-12 PROCEDURE — 86780 TREPONEMA PALLIDUM: CPT | Performed by: OBSTETRICS & GYNECOLOGY

## 2025-02-12 PROCEDURE — 83615 LACTATE (LD) (LDH) ENZYME: CPT | Performed by: OBSTETRICS & GYNECOLOGY

## 2025-02-12 PROCEDURE — 84450 TRANSFERASE (AST) (SGOT): CPT | Performed by: OBSTETRICS & GYNECOLOGY

## 2025-02-12 PROCEDURE — 51703 INSERT BLADDER CATH COMPLEX: CPT

## 2025-02-12 PROCEDURE — C1755 CATHETER, INTRASPINAL: HCPCS

## 2025-02-12 PROCEDURE — 82565 ASSAY OF CREATININE: CPT | Performed by: OBSTETRICS & GYNECOLOGY

## 2025-02-12 PROCEDURE — 25010000002 ROPIVACAINE PER 1 MG: Performed by: NURSE ANESTHETIST, CERTIFIED REGISTERED

## 2025-02-12 PROCEDURE — 84075 ASSAY ALKALINE PHOSPHATASE: CPT | Performed by: OBSTETRICS & GYNECOLOGY

## 2025-02-12 PROCEDURE — 25010000002 BUTORPHANOL PER 1 MG: Performed by: OBSTETRICS & GYNECOLOGY

## 2025-02-12 PROCEDURE — 25010000002 ONDANSETRON PER 1 MG: Performed by: OBSTETRICS & GYNECOLOGY

## 2025-02-12 PROCEDURE — 25010000002 LIDOCAINE-EPINEPHRINE (PF) 1.5 %-1:200000 SOLUTION: Performed by: NURSE ANESTHETIST, CERTIFIED REGISTERED

## 2025-02-12 PROCEDURE — C1755 CATHETER, INTRASPINAL: HCPCS | Performed by: ANESTHESIOLOGY

## 2025-02-12 PROCEDURE — 25010000002 LIDOCAINE-EPINEPHRINE (PF) 2 %-1:200000 SOLUTION: Performed by: NURSE ANESTHETIST, CERTIFIED REGISTERED

## 2025-02-12 PROCEDURE — 25010000002 FENTANYL CITRATE (PF) 50 MCG/ML SOLUTION: Performed by: NURSE ANESTHETIST, CERTIFIED REGISTERED

## 2025-02-12 PROCEDURE — 63710000001 INSULIN GLARGINE PER 5 UNITS: Performed by: OBSTETRICS & GYNECOLOGY

## 2025-02-12 PROCEDURE — 85027 COMPLETE CBC AUTOMATED: CPT | Performed by: OBSTETRICS & GYNECOLOGY

## 2025-02-12 PROCEDURE — 25010000002 BUPIVACAINE (PF) 0.5 % SOLUTION: Performed by: NURSE ANESTHETIST, CERTIFIED REGISTERED

## 2025-02-12 PROCEDURE — 25810000003 LACTATED RINGERS PER 1000 ML: Performed by: OBSTETRICS & GYNECOLOGY

## 2025-02-12 PROCEDURE — 84550 ASSAY OF BLOOD/URIC ACID: CPT | Performed by: OBSTETRICS & GYNECOLOGY

## 2025-02-12 PROCEDURE — 82247 BILIRUBIN TOTAL: CPT | Performed by: OBSTETRICS & GYNECOLOGY

## 2025-02-12 PROCEDURE — 0HQ9XZZ REPAIR PERINEUM SKIN, EXTERNAL APPROACH: ICD-10-PCS | Performed by: OBSTETRICS & GYNECOLOGY

## 2025-02-12 PROCEDURE — 82947 ASSAY GLUCOSE BLOOD QUANT: CPT | Performed by: OBSTETRICS & GYNECOLOGY

## 2025-02-12 PROCEDURE — 84460 ALANINE AMINO (ALT) (SGPT): CPT | Performed by: OBSTETRICS & GYNECOLOGY

## 2025-02-12 PROCEDURE — 59025 FETAL NON-STRESS TEST: CPT

## 2025-02-12 RX ORDER — DOCUSATE SODIUM 100 MG/1
100 CAPSULE, LIQUID FILLED ORAL 2 TIMES DAILY
Status: DISCONTINUED | OUTPATIENT
Start: 2025-02-12 | End: 2025-02-14 | Stop reason: HOSPADM

## 2025-02-12 RX ORDER — ONDANSETRON 2 MG/ML
4 INJECTION INTRAMUSCULAR; INTRAVENOUS ONCE AS NEEDED
Status: DISCONTINUED | OUTPATIENT
Start: 2025-02-12 | End: 2025-02-12

## 2025-02-12 RX ORDER — DIPHENHYDRAMINE HYDROCHLORIDE 50 MG/ML
12.5 INJECTION INTRAMUSCULAR; INTRAVENOUS EVERY 8 HOURS PRN
Status: DISCONTINUED | OUTPATIENT
Start: 2025-02-12 | End: 2025-02-12

## 2025-02-12 RX ORDER — CARBOPROST TROMETHAMINE 250 UG/ML
250 INJECTION, SOLUTION INTRAMUSCULAR AS NEEDED
Status: DISCONTINUED | OUTPATIENT
Start: 2025-02-12 | End: 2025-02-12 | Stop reason: HOSPADM

## 2025-02-12 RX ORDER — FENTANYL CITRATE 50 UG/ML
INJECTION, SOLUTION INTRAMUSCULAR; INTRAVENOUS AS NEEDED
Status: DISCONTINUED | OUTPATIENT
Start: 2025-02-12 | End: 2025-02-12 | Stop reason: SURG

## 2025-02-12 RX ORDER — MISOPROSTOL 200 UG/1
800 TABLET ORAL AS NEEDED
Status: DISCONTINUED | OUTPATIENT
Start: 2025-02-12 | End: 2025-02-12 | Stop reason: HOSPADM

## 2025-02-12 RX ORDER — DIPHENHYDRAMINE HCL 25 MG
25 CAPSULE ORAL NIGHTLY PRN
Status: DISCONTINUED | OUTPATIENT
Start: 2025-02-12 | End: 2025-02-14 | Stop reason: HOSPADM

## 2025-02-12 RX ORDER — NICOTINE POLACRILEX 4 MG
15 LOZENGE BUCCAL
Status: DISCONTINUED | OUTPATIENT
Start: 2025-02-12 | End: 2025-02-14 | Stop reason: HOSPADM

## 2025-02-12 RX ORDER — OXYTOCIN/0.9 % SODIUM CHLORIDE 30/500 ML
125 PLASTIC BAG, INJECTION (ML) INTRAVENOUS ONCE AS NEEDED
Status: DISCONTINUED | OUTPATIENT
Start: 2025-02-12 | End: 2025-02-14 | Stop reason: HOSPADM

## 2025-02-12 RX ORDER — HYDROCODONE BITARTRATE AND ACETAMINOPHEN 5; 325 MG/1; MG/1
1 TABLET ORAL EVERY 4 HOURS PRN
Status: DISCONTINUED | OUTPATIENT
Start: 2025-02-12 | End: 2025-02-14 | Stop reason: HOSPADM

## 2025-02-12 RX ORDER — CALCIUM CARBONATE 500 MG/1
1 TABLET, CHEWABLE ORAL 3 TIMES DAILY PRN
Status: DISCONTINUED | OUTPATIENT
Start: 2025-02-12 | End: 2025-02-14 | Stop reason: HOSPADM

## 2025-02-12 RX ORDER — DEXTROSE MONOHYDRATE 25 G/50ML
25 INJECTION, SOLUTION INTRAVENOUS
Status: DISCONTINUED | OUTPATIENT
Start: 2025-02-12 | End: 2025-02-14 | Stop reason: HOSPADM

## 2025-02-12 RX ORDER — ONDANSETRON 2 MG/ML
4 INJECTION INTRAMUSCULAR; INTRAVENOUS EVERY 6 HOURS PRN
Status: DISCONTINUED | OUTPATIENT
Start: 2025-02-12 | End: 2025-02-14 | Stop reason: HOSPADM

## 2025-02-12 RX ORDER — IBUPROFEN 600 MG/1
1 TABLET ORAL
Status: DISCONTINUED | OUTPATIENT
Start: 2025-02-12 | End: 2025-02-14 | Stop reason: HOSPADM

## 2025-02-12 RX ORDER — PRENATAL VIT/IRON FUM/FOLIC AC 27MG-0.8MG
1 TABLET ORAL DAILY
Status: DISCONTINUED | OUTPATIENT
Start: 2025-02-12 | End: 2025-02-14 | Stop reason: HOSPADM

## 2025-02-12 RX ORDER — FAMOTIDINE 10 MG/ML
20 INJECTION, SOLUTION INTRAVENOUS ONCE AS NEEDED
Status: DISCONTINUED | OUTPATIENT
Start: 2025-02-12 | End: 2025-02-12 | Stop reason: HOSPADM

## 2025-02-12 RX ORDER — CARBOPROST TROMETHAMINE 250 UG/ML
250 INJECTION, SOLUTION INTRAMUSCULAR AS NEEDED
Status: DISCONTINUED | OUTPATIENT
Start: 2025-02-12 | End: 2025-02-14 | Stop reason: HOSPADM

## 2025-02-12 RX ORDER — ONDANSETRON 2 MG/ML
4 INJECTION INTRAMUSCULAR; INTRAVENOUS EVERY 6 HOURS PRN
Status: DISCONTINUED | OUTPATIENT
Start: 2025-02-12 | End: 2025-02-12 | Stop reason: HOSPADM

## 2025-02-12 RX ORDER — ONDANSETRON 4 MG/1
4 TABLET, ORALLY DISINTEGRATING ORAL EVERY 8 HOURS PRN
Status: DISCONTINUED | OUTPATIENT
Start: 2025-02-12 | End: 2025-02-14 | Stop reason: HOSPADM

## 2025-02-12 RX ORDER — CITRIC ACID/SODIUM CITRATE 334-500MG
30 SOLUTION, ORAL ORAL ONCE
Status: DISCONTINUED | OUTPATIENT
Start: 2025-02-12 | End: 2025-02-12

## 2025-02-12 RX ORDER — ACETAMINOPHEN 325 MG/1
650 TABLET ORAL EVERY 6 HOURS PRN
Status: DISCONTINUED | OUTPATIENT
Start: 2025-02-12 | End: 2025-02-14 | Stop reason: HOSPADM

## 2025-02-12 RX ORDER — METHYLERGONOVINE MALEATE 0.2 MG/ML
200 INJECTION INTRAVENOUS ONCE AS NEEDED
Status: DISCONTINUED | OUTPATIENT
Start: 2025-02-12 | End: 2025-02-14 | Stop reason: HOSPADM

## 2025-02-12 RX ORDER — BUPIVACAINE HYDROCHLORIDE 5 MG/ML
INJECTION, SOLUTION EPIDURAL; INTRACAUDAL AS NEEDED
Status: DISCONTINUED | OUTPATIENT
Start: 2025-02-12 | End: 2025-02-12 | Stop reason: SURG

## 2025-02-12 RX ORDER — SIMETHICONE 80 MG
80 TABLET,CHEWABLE ORAL 4 TIMES DAILY PRN
Status: DISCONTINUED | OUTPATIENT
Start: 2025-02-12 | End: 2025-02-14 | Stop reason: HOSPADM

## 2025-02-12 RX ORDER — METHYLERGONOVINE MALEATE 0.2 MG/ML
200 INJECTION INTRAVENOUS ONCE AS NEEDED
Status: DISCONTINUED | OUTPATIENT
Start: 2025-02-12 | End: 2025-02-12 | Stop reason: HOSPADM

## 2025-02-12 RX ORDER — ONDANSETRON 4 MG/1
4 TABLET, ORALLY DISINTEGRATING ORAL EVERY 6 HOURS PRN
Status: DISCONTINUED | OUTPATIENT
Start: 2025-02-12 | End: 2025-02-12 | Stop reason: HOSPADM

## 2025-02-12 RX ORDER — HYDROCORTISONE 25 MG/G
1 CREAM TOPICAL AS NEEDED
Status: DISCONTINUED | OUTPATIENT
Start: 2025-02-12 | End: 2025-02-14 | Stop reason: HOSPADM

## 2025-02-12 RX ORDER — HYDROCODONE BITARTRATE AND ACETAMINOPHEN 10; 325 MG/1; MG/1
1 TABLET ORAL EVERY 4 HOURS PRN
Status: DISCONTINUED | OUTPATIENT
Start: 2025-02-12 | End: 2025-02-14 | Stop reason: HOSPADM

## 2025-02-12 RX ORDER — MISOPROSTOL 200 UG/1
800 TABLET ORAL AS NEEDED
Status: DISCONTINUED | OUTPATIENT
Start: 2025-02-12 | End: 2025-02-14 | Stop reason: HOSPADM

## 2025-02-12 RX ORDER — EPHEDRINE SULFATE 5 MG/ML
10 INJECTION INTRAVENOUS
Status: DISCONTINUED | OUTPATIENT
Start: 2025-02-12 | End: 2025-02-12

## 2025-02-12 RX ORDER — SODIUM CHLORIDE 0.9 % (FLUSH) 0.9 %
1-10 SYRINGE (ML) INJECTION AS NEEDED
Status: DISCONTINUED | OUTPATIENT
Start: 2025-02-12 | End: 2025-02-14 | Stop reason: HOSPADM

## 2025-02-12 RX ORDER — LIDOCAINE HCL/EPINEPHRINE/PF 2%-1:200K
VIAL (ML) INJECTION AS NEEDED
Status: DISCONTINUED | OUTPATIENT
Start: 2025-02-12 | End: 2025-02-12 | Stop reason: SURG

## 2025-02-12 RX ORDER — LIDOCAINE HYDROCHLORIDE AND EPINEPHRINE 15; 5 MG/ML; UG/ML
INJECTION, SOLUTION EPIDURAL AS NEEDED
Status: DISCONTINUED | OUTPATIENT
Start: 2025-02-12 | End: 2025-02-12 | Stop reason: SURG

## 2025-02-12 RX ORDER — ROPIVACAINE HYDROCHLORIDE 2 MG/ML
14 INJECTION, SOLUTION EPIDURAL; INFILTRATION; PERINEURAL CONTINUOUS
Status: DISCONTINUED | OUTPATIENT
Start: 2025-02-12 | End: 2025-02-12

## 2025-02-12 RX ORDER — IBUPROFEN 600 MG/1
600 TABLET, FILM COATED ORAL EVERY 6 HOURS PRN
Status: DISCONTINUED | OUTPATIENT
Start: 2025-02-12 | End: 2025-02-14 | Stop reason: HOSPADM

## 2025-02-12 RX ORDER — PROMETHAZINE HYDROCHLORIDE 12.5 MG/1
12.5 TABLET ORAL EVERY 4 HOURS PRN
Status: DISCONTINUED | OUTPATIENT
Start: 2025-02-12 | End: 2025-02-14 | Stop reason: HOSPADM

## 2025-02-12 RX ADMIN — Medication 1 APPLICATION: at 20:54

## 2025-02-12 RX ADMIN — WITCH HAZEL: 500 SOLUTION RECTAL; TOPICAL at 20:54

## 2025-02-12 RX ADMIN — ONDANSETRON 4 MG: 2 INJECTION INTRAMUSCULAR; INTRAVENOUS at 00:05

## 2025-02-12 RX ADMIN — INSULIN GLARGINE 14 UNITS: 100 INJECTION, SOLUTION SUBCUTANEOUS at 20:55

## 2025-02-12 RX ADMIN — LIDOCAINE HYDROCHLORIDE AND EPINEPHRINE 3 ML: 15; 5 INJECTION, SOLUTION EPIDURAL at 08:16

## 2025-02-12 RX ADMIN — ROPIVACAINE HYDROCHLORIDE 10 ML: 5 INJECTION, SOLUTION EPIDURAL; INFILTRATION; PERINEURAL at 08:20

## 2025-02-12 RX ADMIN — FENTANYL CITRATE 100 MCG: 50 INJECTION, SOLUTION INTRAMUSCULAR; INTRAVENOUS at 08:18

## 2025-02-12 RX ADMIN — ROPIVACAINE HYDROCHLORIDE 14 ML/HR: 2 INJECTION, SOLUTION EPIDURAL; INFILTRATION at 08:23

## 2025-02-12 RX ADMIN — HYDROCODONE BITARTRATE AND ACETAMINOPHEN 1 TABLET: 5; 325 TABLET ORAL at 20:55

## 2025-02-12 RX ADMIN — LIDOCAINE HYDROCHLORIDE,EPINEPHRINE BITARTRATE 2 ML: 20; .005 INJECTION, SOLUTION EPIDURAL; INFILTRATION; INTRACAUDAL; PERINEURAL at 08:21

## 2025-02-12 RX ADMIN — SODIUM CHLORIDE, POTASSIUM CHLORIDE, SODIUM LACTATE AND CALCIUM CHLORIDE 75 ML/HR: 600; 310; 30; 20 INJECTION, SOLUTION INTRAVENOUS at 08:31

## 2025-02-12 RX ADMIN — BUTORPHANOL TARTRATE 1 MG: 1 INJECTION, SOLUTION INTRAMUSCULAR; INTRAVENOUS at 04:47

## 2025-02-12 RX ADMIN — Medication 250 UNITS: at 15:38

## 2025-02-12 RX ADMIN — BUPIVACAINE HYDROCHLORIDE 6 ML: 5 INJECTION, SOLUTION EPIDURAL; INTRACAUDAL at 12:10

## 2025-02-12 RX ADMIN — DOCUSATE SODIUM 100 MG: 100 CAPSULE, LIQUID FILLED ORAL at 20:55

## 2025-02-12 RX ADMIN — BUTORPHANOL TARTRATE 1 MG: 1 INJECTION, SOLUTION INTRAMUSCULAR; INTRAVENOUS at 01:55

## 2025-02-12 RX ADMIN — Medication: at 20:54

## 2025-02-12 NOTE — L&D DELIVERY NOTE
" [facility]   Vaginal Delivery Note    Patient Name: Henna Campuzano  : 1990  MRN: [id number]    Date of Delivery: 2025    Diagnosis     Pre & Post-Delivery:  Intrauterine pregnancy at 37w3d  Labor status: Induced Onset of Labor    Type 2 diabetes mellitus affecting pregnancy in third trimester, antepartum             Problem List    Transfer to Postpartum     Review the Delivery Report for details.     Delivery     Delivery:   Spontaneous Vaginal   YOB: 2027   Time of Birth:  Gestational Age 2:34 PM  37w3d     Anesthesia: Epidural    Delivering clinician:  Lore Hankins MD   Forceps?   No   Vacuum? No    Shoulder dystocia present: No        Delivery narrative:  The patient progressed to complete and delivered a VMI  with Apgars 8/9 the weight is  6#4 via  with epidural anesthesia. Shoulders were delivered easily. The cord was clamped and cut after 60 second delay and the infant was placed on the mother's chest for skin- to - skin. Cord blood was obtained and the placenta was delivered spontaneously intact. 30 units of IV Pitocin was started. Uterine tone was appropriate.   Small right vaginal sidewall laceration(s) noted  and repaired with 3-0 Vicryl.   The patient tolerated the procedure well and remained in the LDR for recovery. All counts correct.        Infant     Findings: male infant     Infant observations: Weight: 2830 g (6 lb 3.8 oz)  Length: 19 in  Observations/Comments:        Apgars: 8  @ 1 minute /    9  @ 5 minutes   Infant Name:      Placenta & Cord         Placenta delivered  Spontaneous at   2025  2:37 PM    Cord:   present.   Nuchal Cord?  no   Cord blood obtained: Yes   Cord gases obtained:  No   Cord gas results: Venous:  No results found for: \"PHCVEN\", \"BECVEN\"    Arterial:  No results found for: \"PHCART\", \"BECART\"     Repair     Episiotomy: Not recorded    No    Lacerations: Yes  Laceration Information  Laceration Repaired?   Perineal:     "   Periurethral:       Labial:       Sulcus:       Vaginal:    yes   Cervical:         Suture used for repair: 3-0 Vicryl  Laceration Length for 3rd or 4th degree lacerations:  cm   Estimated Blood Loss:       Quantitative Blood Loss:  100mL        Complications     none    Disposition     Mother to Mother Baby/Postpartum  in stable condition currently.  Baby to remains with mom  in stable condition currently.    Lore Hankins MD  02/12/25  14:49 EST

## 2025-02-12 NOTE — H&P
DALILA Mcgowan  Obstetric History and Physical    CC: IOL    SUBJECTIVE:     Patient is a 34 y.o. female  currently at 37w3d, who presents with pregnancy c/b AMA, Morbid obesity, poorly controlled T2DM for IOL .  FB overnight. Out this am. Getting more uncomfortable    Prenatal Information:  Prenatal Results       Initial Prenatal Labs       Test Value Reference Range Date Time    Hemoglobin  13.2 g/dL 12.0 - 15.9 24 1606       14.2 g/dL 12.0 - 15.9 24 1440    Hematocrit  39.4 % 34.0 - 46.6 24 1606       41.5 % 34.0 - 46.6 24 1440    Platelets  210 10*3/mm3 140 - 450 24 1606       200 10*3/mm3 140 - 450 24 1440    Rubella IgG  1.18 index Immune >0.99 24 1440    Hepatitis B SAg  Non-Reactive  Non-Reactive 24 1440    Hepatitis C Ab        RPR        T. Pallidum Ab   Non-Reactive  Non-Reactive 24 1440    ABO  A   25 2315    Rh  Positive   25 2315    Antibody Screen  Negative   24 1440    HIV  Non-Reactive  Non-Reactive 24 1440    Urine Culture  No growth   24 1440    Gonorrhea  Negative  Negative 24 1440    Chlamydia  Negative  Negative 24 1440    TSH        HgB A1c   6.20 % 4.80 - 5.60 12/10/24 1724       6.0 % 4.5 - 5.7 24 1457       5.70 % 4.80 - 5.60 24 1440       6.1 % 4.5 - 5.7 24 1034    Varicella IgG  393 index Immune >165 24 1440    Hemoglobinopathy Fractionation        Hemoglobinopathy (genetic testing)        Cystic fibrosis         Spinal muscular atrophy        Fragile X                  Fetal testing        Test Value Reference Range Date Time    NIPT        MSAFP        AFP-4                  2nd and 3rd Trimester       Test Value Reference Range Date Time    Hemoglobin (repeated)  13.1 g/dL 12.0 - 15.9 25 0007       11.8 g/dL 12.0 - 15.9 12/10/24 1724    Hematocrit (repeated)  38.3 % 34.0 - 46.6 25 0007       34.8 % 34.0 - 46.6 12/10/24 1724    Platelets   200  10*3/mm3 140 - 450 02/12/25 0007       203 10*3/mm3 140 - 450 12/10/24 1724       210 10*3/mm3 140 - 450 08/11/24 1606       200 10*3/mm3 140 - 450 08/05/24 1440    1 hour GTT         Antibody Screen (repeated)  Negative   02/11/25 2315    3rd TM syphilis scrn (repeated)  RPR         3rd TM syphilis scrn (repeated) TP-Ab        3rd TM syphilis screen TB-Ab (FTA)        Syphilis cascade test TP-Ab (EIA)        Syphilis cascade TPPA        GTT Fasting        GTT 1 Hr        GTT 2 Hr        GTT 3 Hr        Group B Strep                  Other testing        Test Value Reference Range Date Time    Parvo IgG         CMV IgG                   Drug Screening       Test Value Reference Range Date Time    Amphetamine Screen  Negative  Negative 08/05/24 1440    Barbiturate Screen  Negative  Negative 08/05/24 1440    Benzodiazepine Screen  Negative  Negative 08/05/24 1440    Methadone Screen  Negative  Negative 08/05/24 1440    Phencyclidine Screen  Negative  Negative 08/05/24 1440    Opiates Screen  Negative  Negative 08/05/24 1440    THC Screen  Negative  Negative 08/05/24 1440    Cocaine Screen  Negative  Negative 08/05/24 1440    Propoxyphene Screen        Buprenorphine Screen  Negative  Negative 08/05/24 1440    Methamphetamine Screen  Negative  Negative 08/05/24 1440    Oxycodone Screen  Negative  Negative 08/05/24 1440    Tricyclic Antidepressants Screen  Negative  Negative 08/05/24 1440              Legend    ^: Historical                          External Prenatal Results       Pregnancy Outside Results - Transcribed From Office Records - See Scanned Records For Details       Test Value Date Time    ABO  A  02/11/25 2315    Rh  Positive  02/11/25 2315    Antibody Screen  Negative  02/11/25 2315       Negative  08/05/24 1440    Varicella IgG  393 index 08/05/24 1440    Rubella  1.18 index 08/05/24 1440    Hgb  13.1 g/dL 02/12/25 0007       11.8 g/dL 12/10/24 1724       13.2 g/dL 08/11/24 1606       14.2 g/dL 08/05/24  1440    Hct  38.3 % 25 0007       34.8 % 12/10/24 1724       39.4 % 24 1606       41.5 % 24 1440    HgB A1c   6.20 % 12/10/24 1724       6.0 % 24 1457       5.70 % 24 1440       6.1 % 24 1034    1h GTT       3h GTT Fasting       3h GTT 1 hour       3h GTT 2 hour       3h GTT 3 hour        Gonorrhea (discrete)  Negative  24 1440    Chlamydia (discrete)  Negative  24 1440    RPR       Syphils cascade: TP-Ab (FTA)  Non-Reactive  24 144    TP-Ab  Non-Reactive  24 144    TP-Ab (EIA)       TPPA       HBsAg  Non-Reactive  24 144    Herpes Simplex Virus PCR       Herpes Simplex VIrus Culture       HIV  Non-Reactive  24 1440    Hep C RNA Quant PCR       Hep C Antibody       AFP       NIPT       Cystic Fibrosis (Khoi)       Cystic Fibroisis        Spinal Muscular atrophy       Fragile X       Group B Strep       GBS Susceptibility to Clindamycin       GBS Susceptibility to Erythromycin       Fetal Fibronectin       Genetic Testing, Maternal Blood                 Drug Screening       Test Value Date Time    Urine Drug Screen       Amphetamine Screen  Negative  24 1440    Barbiturate Screen  Negative  24 1440    Benzodiazepine Screen  Negative  24 1440    Methadone Screen  Negative  24 1440    Phencyclidine Screen  Negative  24 1440    Opiates Screen  Negative  24 1440    THC Screen  Negative  24 1440    Cocaine Screen       Propoxyphene Screen       Buprenorphine Screen  Negative  24 1440    Methamphetamine Screen       Oxycodone Screen  Negative  24 1440    Tricyclic Antidepressants Screen  Negative  24 1440              Legend    ^: Historical                             OB History:                     OB History    Para Term  AB Living   11 1 0 1 9 1   SAB IAB Ectopic Molar Multiple Live Births   9 0 0 0 0 1      # Outcome Date GA Lbr Kevan/2nd Weight Sex Type Anes PTL Lv   11  Current            10 SAB  6w0d    SAB      9 SAB 2015 12w0d    SAB      8 SAB  6w0d    SAB      7 SAB 2013 6w0d    SAB      6 SAB 2013 6w0d    SAB      5 SAB 2011 8w0d    SAB      4 SAB 2011 6w0d    SAB      3 SAB  6w0d    SAB      2 2009 6w0d    SAB      1  08 34w0d  2948 g (6 lb 8 oz) M Vag-Vacuum EPI  FERNANDO      Birth Comments: preeclampsia; GDM      Obstetric Comments   FOB #1 Pregnancy #1, 2, 3   FOB #2 Pregnancy #4 SAB, D&C, #5-10   FOB #3 Pregnancy #1  current   9 miscarriages      Allergies:                        Allergies   Allergen Reactions    Sulfamethoxazole-Trimethoprim Hives and Itching     Swelling of hands       Past Medical History: Past Medical History:   Diagnosis Date    Fibromyalgia     Gestational diabetes     Hypertension     Rheumatoid arthritis     Type 2 diabetes mellitus       Past Surgical History Past Surgical History:   Procedure Laterality Date    DILATATION AND CURETTAGE      EAR RECONSTRUCTION      LAPAROSCOPIC CHOLECYSTECTOMY      NOSE SURGERY  2004    TONSILLECTOMY AND ADENOIDECTOMY        Family History: Family History   Problem Relation Age of Onset    Mental illness Mother     Hypertension Father     Diabetes Father     Fibromyalgia Father     Rheum arthritis Father     Fibromyalgia Sister     Rheum arthritis Sister     Crohn's disease Sister     Lupus Sister     Stroke Other     Heart attack Other     Diabetes Other     Liver disease Other       Social History:  reports that she has been smoking cigarettes. She has been exposed to tobacco smoke. She has never used smokeless tobacco.   reports no history of alcohol use.   reports no history of drug use.    General ROS: Pertinent items are noted in HPI, all other systems reviewed and negative   Medications: Dexcom G7 Sensor, Insulin Lispro, Insulin Pen Needle, ONE TOUCH ULTRA 2, Pseudoephedrine-APAP-DM, acetaminophen, acetone (urine) test, amoxicillin, aspirin, glucose blood, insulin  glargine, and prenatal vitamin 27-0.8       Objective       Vital Signs Range for the last 24 hours  Temperature: Temp:  [97.6 °F (36.4 °C)-98.6 °F (37 °C)] 97.6 °F (36.4 °C)   BP: BP: (117-126)/(56-60) 126/60   Pulse: Heart Rate:  [78-97] 84   Respirations: Resp:  [18] 18   SPO2:                 Physical Examination: General appearance - alert, well appearing, and in no distress  Chest - clear to auscultation, no wheezes, rales or rhonchi, symmetric air entry  Heart - normal rate, regular rhythm, normal S1, S2, no murmurs, rubs, clicks or gallops  Abdomen - Soft, gravid, nontender  Extremities - pedal edema tr +      Presentation: VTX   Cervix: Exam by: Method: sterile vaginal exam performed (antonio)   Dilation: Cervical Dilation (cm): 5   Effacement: Cervical Effacement: 70   Station:  -2    AROM clear fluid       Fetal Heart Rate Assessment           Baseline: Fetal HR Baseline: normal range   Variability: Fetal HR Variability: moderate (amplitude range 6 to 25 bpm)   Accels: Fetal HR Accelerations: greater than/equal to 15 bpm, lasting at least 15 seconds   Decels: Fetal HR Decelerations: absent   Tracing Category:       Uterine Assessment   Method: Method: telemetry   Frequency (min): Contraction Frequency (Minutes): 2-5   Ctx Count in 10 min: Contractions in 10 Minutes: 2     Laboratory Results:  Preelampsia Labs:    Results from last 7 days   Lab Units 02/12/25  0007 02/12/25  0001   WBC 10*3/mm3 16.79*  --    HEMOGLOBIN g/dL 13.1  --    HEMATOCRIT % 38.3  --    PLATELETS 10*3/mm3 200  --    ALT (SGPT) U/L  --  9   AST (SGOT) U/L  --  12   CREATININE mg/dL  --  0.51*   BILIRUBIN mg/dL  --  <0.2   LDH U/L  --  211   URIC ACID mg/dL  --  4.2     Blood Sugar in Office          12/11/2024    07:51 12/11/2024    10:37 12/11/2024    18:52 12/11/2024    21:31 12/12/2024    00:16 12/12/2024    07:46 2/12/2025    04:34   Blood Sugar in Office   Glucose 118  113  192  156  134  109  97             Assessment/Plan:    IUP 37w3d     1.IOL  -PPP, no s/p AROM    2.T2DM  -correction insulin for now  -FSBS q4    3.GBS neg    4. FWB reassuring      Lore Hankins MD  2/12/2025  07:29 EST

## 2025-02-12 NOTE — PROGRESS NOTES
"Subjective  Patient without complaints.  Her primary obstetrician requests placement of cervical Randolph to initiate induction of labor.    Objective  /56 (BP Location: Right arm, Patient Position: Lying)   Pulse 97   Temp 98.6 °F (37 °C) (Oral)   Resp 18   Ht 172.7 cm (68\")   Wt 135 kg (298 lb)   BMI 45.31 kg/m²   General: No acute distress  Lungs: No increased work of breathing  Cervix: 2/50/-3, mid position, firm  Fetal heart rate tracins to 130s, moderate variability, positive accelerations  Sammy Martinez: Every 2 to 4 minutes    Assessment and plan  24 Urdu Randolph catheter placed in the cervix with bulb past internal os.  Bulb inflated with 60 mL of sterile saline solution.  Gentle traction applied to the Randolph and Randolph in place.  Patient tolerated procedure well    Mariposa Duarte MD  2025  00:43 EST    "

## 2025-02-12 NOTE — ANESTHESIA PROCEDURE NOTES
Labor Epidural      Patient reassessed immediately prior to procedure    Patient location during procedure: OB  Performed By  CRNA/CAA: Binta Sauceda CRNA  Preanesthetic Checklist  Completed: patient identified, IV checked, risks and benefits discussed, surgical consent, monitors and equipment checked, pre-op evaluation and timeout performed  Prep:  Pt Position:sitting  Sterile Tech:cap, gloves, mask and sterile barrier  Prep:DuraPrep  Monitoring:blood pressure monitoring  Epidural Block Procedure:  Approach:midline  Guidance:palpation technique  Location:L3-L4  Needle Type:Tuohy  Needle Gauge:17 G  Loss of Resistance Medium: saline  Loss of Resistance: 10cm  Catheter at skin depth (cm): 16cm.  Paresthesia: none  Aspiration:negative  Test Dose:negative  Number of Attempts: 1  Post Assessment:  Dressing:occlusive dressing applied and secured with tape  Pt Tolerance:patient tolerated the procedure well with no apparent complications  Complications:no

## 2025-02-12 NOTE — PLAN OF CARE
Goal Outcome Evaluation:  Plan of Care Reviewed With: patient, significant other        Progress: improving

## 2025-02-12 NOTE — ANESTHESIA PREPROCEDURE EVALUATION
Anesthesia Evaluation     Patient summary reviewed and Nursing notes reviewed   NPO Solid Status: > 4 hours  NPO Liquid Status: > 4 hours           Airway   Dental      Pulmonary - negative pulmonary ROS   Cardiovascular     (+) hypertension      Neuro/Psych- negative ROS  GI/Hepatic/Renal/Endo    (+) obesity, morbid obesity, diabetes mellitus gestational using insulin    Musculoskeletal (-) negative ROS    Abdominal    Substance History - negative use     OB/GYN    (+) Pregnant        Other                    Anesthesia Plan    ASA 3     epidural       Anesthetic plan, risks, benefits, and alternatives have been provided, discussed and informed consent has been obtained with: patient.    CODE STATUS:    Level Of Support Discussed With: Patient  Code Status (Patient has no pulse and is not breathing): CPR (Attempt to Resuscitate)  Medical Interventions (Patient has pulse or is breathing): Full Support

## 2025-02-13 PROBLEM — O09.299 HISTORY OF PRE-ECLAMPSIA IN PRIOR PREGNANCY, CURRENTLY PREGNANT: Status: RESOLVED | Noted: 2024-08-19 | Resolved: 2025-02-13

## 2025-02-13 PROBLEM — O10.919 CHRONIC HYPERTENSION AFFECTING PREGNANCY: Status: RESOLVED | Noted: 2024-08-19 | Resolved: 2025-02-13

## 2025-02-13 PROBLEM — N96 RECURRENT PREGNANCY LOSS: Status: RESOLVED | Noted: 2024-08-19 | Resolved: 2025-02-13

## 2025-02-13 PROBLEM — O47.03 FALSE LABOR BEFORE 37 COMPLETED WEEKS OF GESTATION DURING PREGNANCY IN THIRD TRIMESTER, ANTEPARTUM: Status: RESOLVED | Noted: 2025-02-07 | Resolved: 2025-02-13

## 2025-02-13 PROBLEM — O24.419 GESTATIONAL DIABETES: Status: RESOLVED | Noted: 2024-12-11 | Resolved: 2025-02-13

## 2025-02-13 PROBLEM — O24.119 TYPE 2 DIABETES MELLITUS AFFECTING PREGNANCY, ANTEPARTUM: Status: RESOLVED | Noted: 2024-08-19 | Resolved: 2025-02-13

## 2025-02-13 LAB
BASOPHILS # BLD AUTO: 0.03 10*3/MM3 (ref 0–0.2)
BASOPHILS NFR BLD AUTO: 0.2 % (ref 0–1.5)
DEPRECATED RDW RBC AUTO: 47.1 FL (ref 37–54)
EOSINOPHIL # BLD AUTO: 0.13 10*3/MM3 (ref 0–0.4)
EOSINOPHIL NFR BLD AUTO: 0.9 % (ref 0.3–6.2)
ERYTHROCYTE [DISTWIDTH] IN BLOOD BY AUTOMATED COUNT: 13.8 % (ref 12.3–15.4)
GLUCOSE BLDC GLUCOMTR-MCNC: 112 MG/DL (ref 70–130)
GLUCOSE BLDC GLUCOMTR-MCNC: 135 MG/DL (ref 70–130)
GLUCOSE BLDC GLUCOMTR-MCNC: 136 MG/DL (ref 70–130)
GLUCOSE BLDC GLUCOMTR-MCNC: 140 MG/DL (ref 70–130)
HCT VFR BLD AUTO: 33.9 % (ref 34–46.6)
HGB BLD-MCNC: 11.2 G/DL (ref 12–15.9)
IMM GRANULOCYTES # BLD AUTO: 0.07 10*3/MM3 (ref 0–0.05)
IMM GRANULOCYTES NFR BLD AUTO: 0.5 % (ref 0–0.5)
LYMPHOCYTES # BLD AUTO: 2.98 10*3/MM3 (ref 0.7–3.1)
LYMPHOCYTES NFR BLD AUTO: 19.9 % (ref 19.6–45.3)
MCH RBC QN AUTO: 30.7 PG (ref 26.6–33)
MCHC RBC AUTO-ENTMCNC: 33 G/DL (ref 31.5–35.7)
MCV RBC AUTO: 92.9 FL (ref 79–97)
MONOCYTES # BLD AUTO: 0.81 10*3/MM3 (ref 0.1–0.9)
MONOCYTES NFR BLD AUTO: 5.4 % (ref 5–12)
NEUTROPHILS NFR BLD AUTO: 10.94 10*3/MM3 (ref 1.7–7)
NEUTROPHILS NFR BLD AUTO: 73.1 % (ref 42.7–76)
NRBC BLD AUTO-RTO: 0 /100 WBC (ref 0–0.2)
PLATELET # BLD AUTO: 181 10*3/MM3 (ref 140–450)
PMV BLD AUTO: 11.9 FL (ref 6–12)
RBC # BLD AUTO: 3.65 10*6/MM3 (ref 3.77–5.28)
WBC NRBC COR # BLD AUTO: 14.96 10*3/MM3 (ref 3.4–10.8)

## 2025-02-13 PROCEDURE — 82948 REAGENT STRIP/BLOOD GLUCOSE: CPT

## 2025-02-13 PROCEDURE — 85025 COMPLETE CBC W/AUTO DIFF WBC: CPT | Performed by: OBSTETRICS & GYNECOLOGY

## 2025-02-13 PROCEDURE — 63710000001 INSULIN GLARGINE PER 5 UNITS: Performed by: OBSTETRICS & GYNECOLOGY

## 2025-02-13 RX ADMIN — PRENATAL VITAMINS-IRON FUMARATE 27 MG IRON-FOLIC ACID 0.8 MG TABLET 1 TABLET: at 08:22

## 2025-02-13 RX ADMIN — DOCUSATE SODIUM 100 MG: 100 CAPSULE, LIQUID FILLED ORAL at 08:22

## 2025-02-13 RX ADMIN — HYDROCODONE BITARTRATE AND ACETAMINOPHEN 1 TABLET: 5; 325 TABLET ORAL at 07:13

## 2025-02-13 RX ADMIN — INSULIN GLARGINE 14 UNITS: 100 INJECTION, SOLUTION SUBCUTANEOUS at 20:55

## 2025-02-13 RX ADMIN — DOCUSATE SODIUM 100 MG: 100 CAPSULE, LIQUID FILLED ORAL at 20:55

## 2025-02-13 RX ADMIN — HYDROCODONE BITARTRATE AND ACETAMINOPHEN 1 TABLET: 5; 325 TABLET ORAL at 13:53

## 2025-02-13 RX ADMIN — ACETAMINOPHEN 650 MG: 325 TABLET ORAL at 02:48

## 2025-02-13 RX ADMIN — IBUPROFEN 600 MG: 600 TABLET, FILM COATED ORAL at 08:22

## 2025-02-13 RX ADMIN — IBUPROFEN 600 MG: 600 TABLET, FILM COATED ORAL at 13:53

## 2025-02-13 RX ADMIN — INSULIN GLARGINE 14 UNITS: 100 INJECTION, SOLUTION SUBCUTANEOUS at 08:22

## 2025-02-13 RX ADMIN — IBUPROFEN 600 MG: 600 TABLET, FILM COATED ORAL at 02:03

## 2025-02-13 RX ADMIN — HYDROCODONE BITARTRATE AND ACETAMINOPHEN 1 TABLET: 10; 325 TABLET ORAL at 20:47

## 2025-02-13 NOTE — PROGRESS NOTES
2/13/2025  PPD #1    Subjective   Henna feels well.  Patient describes her lochia as less than menses.  Pain is well controlled       Objective   Temp: Temp:  [97.9 °F (36.6 °C)-98.9 °F (37.2 °C)] 98.1 °F (36.7 °C) Temp src: Oral   BP: BP: (112-143)/(65-76) 112/68        Pulse: Heart Rate:  [] 94  RR: Resp:  [16-22] 18    General:  No acute distress   Abdomen: Fundus firm and beneath umbilicus   Pelvis: deferred     Lab Results   Component Value Date    WBC 16.79 (H) 02/12/2025    HGB 13.1 02/12/2025    HCT 38.3 02/12/2025    MCV 90.5 02/12/2025     02/12/2025    POCGLU 112 02/13/2025    CREATININE 0.51 (L) 02/12/2025    URICACID 4.2 02/12/2025    AST 12 02/12/2025    ALT 9 02/12/2025     02/12/2025    HEPBSAG Non-Reactive 08/05/2024     Results from last 7 days   Lab Units 02/11/25  0525   ABO TYPING  A   RH TYPING  Positive   ANTIBODY SCREEN  Negative       Assessment  PPD# 1 after vaginal delivery    Plan  Supportive care, discharge as clinically indicated.       This note has been electronically signed.    Sara Munoz CNM  09:55 EST  February 13, 2025

## 2025-02-13 NOTE — ANESTHESIA POSTPROCEDURE EVALUATION
Patient: Henna Campuzano    Procedure Summary       Date: 02/12/25 Room / Location:     Anesthesia Start: 0758 Anesthesia Stop: 1437    Procedure: LABOR ANALGESIA Diagnosis:     Scheduled Providers:  Provider: Shabnam Hastings DO    Anesthesia Type: epidural ASA Status: 3            Anesthesia Type: epidural    Vitals  Vitals Value Taken Time   /68 02/13/25 0801   Temp 98.1 °F (36.7 °C) 02/13/25 0801   Pulse 94 02/13/25 0801   Resp 18 02/13/25 0801   SpO2             Post Anesthesia Care and Evaluation    Patient location during evaluation: bedside  Patient participation: complete - patient participated  Level of consciousness: awake and alert  Pain management: adequate    Airway patency: patent  Anesthetic complications: No anesthetic complications    Cardiovascular status: acceptable  Respiratory status: acceptable  Hydration status: acceptable  Post Neuraxial Block status: Motor and sensory function returned to baseline and No signs or symptoms of PDPH

## 2025-02-13 NOTE — LACTATION NOTE
02/13/25 0931   Maternal Information   Date of Referral 02/13/25   Person Making Referral lactation consultant   Maternal Reason for Referral previous breastfeeding issues  (with now 16yo)   Infant Reason for Referral 35-37 weeks gestation;tight frenulum  (37w3d)   Maternal Assessment   Breast Shape Bilateral:;wide;angled   Breast Density Bilateral:;soft   Nipples Left:;graspable;short;Right:;flat   Left Nipple Symptoms blisters;redness;tender   Right Nipple Symptoms nontender;intact   Maternal Infant Feeding   Maternal Emotional State receptive   Infant Positioning clutch/football;cross-cradle  (fb left independently; lc assist attempt on right in cc)   Signs of Milk Transfer audible swallow  (on left)   Pain with Feeding yes   Pain Location nipple, left   Pain Description sharp   Comfort Measures Before/During Feeding infant position adjusted;latch adjusted;maternal position adjusted;other (see comments)  (16mm nipple shield introduced)   Comfort Measures Following Feeding air-drying encouraged   Nipple Shape After Feeding, Left Breast pinched   Latch Assistance verbal guidance offered;full assistance needed   Support Person Involvement actively supporting mother   Milk Expression/Equipment   Breast Pump Type double electric, personal  (s2)   Breast Pump Flange Size other (see comments)  (discussed tightest comfortable fit)   Equipment for Home Use breast pump provided;breast pump ordered through insurance   Breast Pumping   Breast Pumping Interventions other (see comments)  (encouraged to pump in place of short or missed feedings and with supplementation)   Lactation Referrals   Lactation Referrals outpatient lactation program   Outpatient Lactation Program Lactation Follow-up Date/Time within 1-2w; live far away, may find provider closer to home.     Courtesy visit for newly postpartum couplet. MOB  reports she breast fed first child 17y ago, for about 2 weeks. That infant was readmitted for jaundice and  weight loss and she switched to formula at that time. MOB notes she is concerned about oral restrictions due to noticing a heart-shaped tongue and visible lingual frenulum to tip of tongue. LC noted anterior attachment of lingual frenulum with attachment to lower gumline. SLP consult placed for further oral evaluation.  Infant was latched on left side when LC entered, MOB c/o pain with latch. Left nipple noted to be pinched with blisters starting to form when latch broken. Discussed repositioning baby so belly faces MOB and can focus on neck extension. Introduced 16mm shield to help infant latch/MOB comfort. Demonstrated placement on right nipple. Infant latched, but no suckle noted. Just held breast in mouth despite stimulation. Encouraged skin to skin and to call for latch assistance as needed.   Educational handout provided and reviewed. Discussed supplementation options as needed, and provided with s2 and pumping log.

## 2025-02-14 VITALS
HEART RATE: 91 BPM | DIASTOLIC BLOOD PRESSURE: 77 MMHG | WEIGHT: 293 LBS | RESPIRATION RATE: 18 BRPM | TEMPERATURE: 98.2 F | HEIGHT: 68 IN | SYSTOLIC BLOOD PRESSURE: 129 MMHG | BODY MASS INDEX: 44.41 KG/M2

## 2025-02-14 LAB — GLUCOSE BLDC GLUCOMTR-MCNC: 87 MG/DL (ref 70–130)

## 2025-02-14 PROCEDURE — 82948 REAGENT STRIP/BLOOD GLUCOSE: CPT

## 2025-02-14 PROCEDURE — 63710000001 INSULIN GLARGINE PER 5 UNITS: Performed by: OBSTETRICS & GYNECOLOGY

## 2025-02-14 RX ORDER — IBUPROFEN 600 MG/1
600 TABLET, FILM COATED ORAL EVERY 6 HOURS PRN
Qty: 60 TABLET | Refills: 0 | Status: SHIPPED | OUTPATIENT
Start: 2025-02-14

## 2025-02-14 RX ORDER — INSULIN LISPRO 100 [IU]/ML
INJECTION, SOLUTION INTRAVENOUS; SUBCUTANEOUS
Qty: 30 ML | Refills: 0 | Status: SHIPPED | OUTPATIENT
Start: 2025-02-14

## 2025-02-14 RX ORDER — PSEUDOEPHEDRINE HCL 30 MG
100 TABLET ORAL 2 TIMES DAILY
Qty: 60 CAPSULE | Refills: 0 | Status: SHIPPED | OUTPATIENT
Start: 2025-02-14

## 2025-02-14 RX ORDER — FUROSEMIDE 40 MG/1
40 TABLET ORAL ONCE
Status: COMPLETED | OUTPATIENT
Start: 2025-02-14 | End: 2025-02-14

## 2025-02-14 RX ADMIN — INSULIN GLARGINE 14 UNITS: 100 INJECTION, SOLUTION SUBCUTANEOUS at 09:18

## 2025-02-14 RX ADMIN — HYDROCODONE BITARTRATE AND ACETAMINOPHEN 1 TABLET: 5; 325 TABLET ORAL at 07:54

## 2025-02-14 RX ADMIN — PRENATAL VITAMINS-IRON FUMARATE 27 MG IRON-FOLIC ACID 0.8 MG TABLET 1 TABLET: at 09:18

## 2025-02-14 RX ADMIN — HYDROCODONE BITARTRATE AND ACETAMINOPHEN 1 TABLET: 5; 325 TABLET ORAL at 11:34

## 2025-02-14 RX ADMIN — FUROSEMIDE 40 MG: 40 TABLET ORAL at 11:34

## 2025-02-14 RX ADMIN — DOCUSATE SODIUM 100 MG: 100 CAPSULE, LIQUID FILLED ORAL at 09:18

## 2025-02-14 RX ADMIN — IBUPROFEN 600 MG: 600 TABLET, FILM COATED ORAL at 01:07

## 2025-02-14 RX ADMIN — IBUPROFEN 600 MG: 600 TABLET, FILM COATED ORAL at 14:12

## 2025-02-14 NOTE — PROGRESS NOTES
[facility]  Obstetric Progress Note    Chief Complaint: PPD 2    Subjective     Feeling well. Pain controlled. ronaldo diet. +amb/void. Lochia appr  Objective     Vital Signs Range for the last 24 hours  Temp:  [98 °F (36.7 °C)-98.2 °F (36.8 °C)] 98.2 °F (36.8 °C)   BP: (122-129)/(59-77) 129/77   Heart Rate:  [91-96] 91   Resp:  [16-20] 18                   Intake/Output last 24 hours:    No intake or output data in the 24 hours ending 25 0842    Intake/Output this shift:    No intake/output data recorded.    Physical Exam:  General: No acute distress   Heart RRR   Lungs CTAB     Abdomen Soft, non tender, fundus firm   Extremities Exam of extremities: pedal edema tr +       Laboratory Results:  CBC:      Lab 25  0941 25  0007   WBC 14.96* 16.79*   HEMOGLOBIN 11.2* 13.1   HEMATOCRIT 33.9* 38.3   PLATELETS 181 200   NEUTROS ABS 10.94*  --    IMMATURE GRANS (ABS) 0.07*  --    LYMPHS ABS 2.98  --    MONOS ABS 0.81  --    EOS ABS 0.13  --    MCV 92.9 90.5          Assessment/Plan: PPD 2 s/p   1.RPPC: Advance care  2. Dc home  3. T2DM 1/2 home insulin  4. Dc home          Lore Hankins MD  2025  08:42 EST

## 2025-02-14 NOTE — DISCHARGE SUMMARY
[facility]  Discharge Summary      Patient: Henna Campuzano            : 1990  MRN: [id number]  CSN: [id number]  Consult:   Consults       No orders found from 2025 to 2025.               Gestational Age at Discharge:  37w3d, delivered      Admission  Diagnosis: Type 2 diabetes mellitus affecting pregnancy in third trimester, antepartum    Discharge Diagnosis:   Patient Active Problem List   Diagnosis    Antepartum multigravida of advanced maternal age    Morbid obesity with BMI of 40.0-44.9, adult    Fibromyalgia    Type 2 diabetes mellitus affecting pregnancy in third trimester, antepartum       Date of Admission: 2025    Date of Discharge:  25    Procedures:             Service:  Obstetrics    Hospital Course:       Pt admitted for  IOL  after pregnancy c/b poorly controlled T2DM, AMA, Morbid obesity. She was delivered without complication. She delivered a VMI with Apgars 8/9 via . See note for full detail. Her postpartum course was uncomplicated and was discharged home on PPD 2    Labs:    Lab Results (last 24 hours)       Procedure Component Value Units Date/Time    POC Glucose Once [[id number]]  (Abnormal) Collected: 25    Specimen: Blood Updated: 25 194     Glucose 135 mg/dL     POC Glucose Once [[id number]]  (Abnormal) Collected: 25 1422    Specimen: Blood Updated: 25 1424     Glucose 140 mg/dL     CBC & Differential [[id number]]  (Abnormal) Collected: 25 0941    Specimen: Blood Updated: 25 1052    Narrative:      The following orders were created for panel order CBC & Differential.  Procedure                               Abnormality         Status                     ---------                               -----------         ------                     CBC Auto Differential[[id number]]        Abnormal            Final result                 Please view results for these tests on the individual orders.    CBC Auto  Differential [[id number]]  (Abnormal) Collected: 02/13/25 0941    Specimen: Blood Updated: 02/13/25 1052     WBC 14.96 10*3/mm3      RBC 3.65 10*6/mm3      Hemoglobin 11.2 g/dL      Hematocrit 33.9 %      MCV 92.9 fL      MCH 30.7 pg      MCHC 33.0 g/dL      RDW 13.8 %      RDW-SD 47.1 fl      MPV 11.9 fL      Platelets 181 10*3/mm3      Neutrophil % 73.1 %      Lymphocyte % 19.9 %      Monocyte % 5.4 %      Eosinophil % 0.9 %      Basophil % 0.2 %      Immature Grans % 0.5 %      Neutrophils, Absolute 10.94 10*3/mm3      Lymphocytes, Absolute 2.98 10*3/mm3      Monocytes, Absolute 0.81 10*3/mm3      Eosinophils, Absolute 0.13 10*3/mm3      Basophils, Absolute 0.03 10*3/mm3      Immature Grans, Absolute 0.07 10*3/mm3      nRBC 0.0 /100 WBC     POC Glucose Once [[id number]]  (Abnormal) Collected: 02/13/25 1022    Specimen: Blood Updated: 02/13/25 1023     Glucose 136 mg/dL     POC Glucose Once [[id number]]  (Normal) Collected: 02/13/25 0805    Specimen: Blood Updated: 02/13/25 0808     Glucose 112 mg/dL     POC Glucose Once [[id number]]  (Abnormal) Collected: 02/12/25 2042    Specimen: Blood Updated: 02/12/25 2045     Glucose 150 mg/dL           HOSPITAL LABS:  Lab Results   Component Value Date    WBC 14.96 (H) 02/13/2025    HGB 11.2 (L) 02/13/2025    HCT 33.9 (L) 02/13/2025    MCV 92.9 02/13/2025     02/13/2025    POCGLU 135 (H) 02/13/2025    CREATININE 0.51 (L) 02/12/2025    URICACID 4.2 02/12/2025    AST 12 02/12/2025    ALT 9 02/12/2025     02/12/2025     Results from last 7 days   Lab Units 02/11/25  6860   ABO TYPING  A   RH TYPING  Positive   ANTIBODY SCREEN  Negative       IMAGING        Discharge Medications     Discharge Medications        Continue These Medications        Instructions Start Date   Dexcom G7 Sensor misc   1 each, Not Applicable, Every 10 Days      Insulin Pen Needle 32G X 4 MM misc   1 each, Not Applicable, 4 Times Daily      ONE TOUCH ULTRA 2 w/Device kit   See Admin  Instructions             ASK your doctor about these medications        Instructions Start Date   acetaminophen 500 MG tablet  Commonly known as: TYLENOL   500 mg, Every 6 Hours PRN      acetone (urine) test strip   1 strip, Not Applicable, Daily      amoxicillin 875 MG tablet  Commonly known as: AMOXIL   875 mg, 3 times daily      aspirin 81 MG EC tablet   81 mg, Daily      DAYQUIL PO   As Needed      insulin glargine 100 UNIT/ML injection  Commonly known as: LANTUS, SEMGLEE   28 Units, Subcutaneous, 2 Times Daily      Insulin Lispro 100 UNIT/ML injection  Commonly known as: humaLOG   Inject 50 units under the skin into the appropriate area with breakfast, 66 units with lunch and 66 units with dinner.      OneTouch Verio test strip  Generic drug: glucose blood   See Admin Instructions      prenatal vitamin 27-0.8 27-0.8 MG tablet tablet   1 tablet, Daily               Allergies:   Allergies   Allergen Reactions    Sulfamethoxazole-Trimethoprim Hives and Itching     Swelling of hands         Discharge Disposition:  To Home    Discharge Condition:  Stable    Discharge Diet: Regular    Activity at Discharge: pelvic rest,     Follow-up Appointments: 6 weeks        Lore Hankins MD  02/13/25  20:25 EST

## 2025-02-14 NOTE — PROGRESS NOTES
Patient seen in Maternal Fetal Medicine clinic today. Please see full note in under imaging tab of patient chart in Epic (Viewpoint report).    Geena Decker MD'

## 2025-02-14 NOTE — LACTATION NOTE
Mom supplementing and states infant will not latch at breast now.  Discussed shield use and using formula at the breast to entice infant to nurse longer or assist with latching.  Mom has spectra pump in room but has not used yet.  Encouraged mom to begin pumping q 3 hours ASAP. Mom states understanding.

## 2025-02-14 NOTE — PLAN OF CARE
Goal Outcome Evaluation:  Plan of Care Reviewed With: patient        Progress: improving  Outcome Evaluation: vss, lochia/fundus wnl, voiding, pain controlled well, no s/s of infection

## 2025-02-14 NOTE — CASE MANAGEMENT/SOCIAL WORK
Continued Stay Note  [facility]     Patient Name: Henna Campuzano  MRN: [id number]  Today's Date: 2/14/2025    Admit Date: 2/11/2025        Discharge Plan       Row Name 02/14/25 1029       Plan    Plan Comments PA completed via Cover my meds for Semglee pen. Key # [id number]                   Discharge Codes    No documentation.                 Expected Discharge Date and Time       Expected Discharge Date Expected Discharge Time    Feb 14, 2025               SAUL Alberts

## 2025-02-16 ENCOUNTER — MATERNAL SCREENING (OUTPATIENT)
Dept: CALL CENTER | Facility: HOSPITAL | Age: 35
End: 2025-02-16
Payer: COMMERCIAL

## 2025-02-16 NOTE — OUTREACH NOTE
Maternal Screening Survey      Flowsheet Row Responses   Eligibility Eligible   Prep survey completed? Yes   Facility patient discharged from? [facility] - Registered Nurse

## 2025-02-17 ENCOUNTER — TELEPHONE (OUTPATIENT)
Dept: OBSTETRICS AND GYNECOLOGY | Facility: HOSPITAL | Age: 35
End: 2025-02-17
Payer: COMMERCIAL

## 2025-02-17 NOTE — TELEPHONE ENCOUNTER
Spoke with patient over the phone.  Patient states she and baby are doing well and that she is off all insulin.  Patient plans to follow up with Dr. Pineda but has not yet made an appointment.  Encouraged to do so.  Patient is agreeable and voices understanding.  Cara Schwartz RN

## 2025-02-27 ENCOUNTER — MATERNAL SCREENING (OUTPATIENT)
Dept: CALL CENTER | Facility: HOSPITAL | Age: 35
End: 2025-02-27
Payer: COMMERCIAL

## 2025-02-27 NOTE — OUTREACH NOTE
Maternal Screening Survey      Flowsheet Row Responses   Facility patient discharged from? [place]   Attempt successful? Yes   Call start time 1214   Call end time 1219   I have been able to laugh and see the funny side of things. 0   I have looked forward with enjoyment to things. 0   I have blamed myself unnecessarily when things went wrong. 2   I have been anxious or worried for no good reason. 2   I have felt scared or panicky for no good reason. 0   Things have been getting on top of me. 1   I have been so unhappy that I have had difficulty sleeping. 0   I have felt sad or miserable. 0   I have been so unhappy that I have been crying. 2   The thought of harming myself has occurred to me. 0   [place]  Depression Scale Total 7   Did any of your parents have problems with alcohol or drug use? No   Do any of your peers have problems with alcohol or drug use? No   Does your partner have problems with alcohol or drug use? No   Before you were pregnant did you have problems with alcohol or drug use? (past) No   In the past month, did you drink beer, wine, liquor or use any other drugs? (pregnancy) No   Maternal Screening call completed Yes   Call end time 1219              [facility] - Registered Nurse